# Patient Record
Sex: FEMALE | Race: WHITE | NOT HISPANIC OR LATINO | Employment: FULL TIME | URBAN - METROPOLITAN AREA
[De-identification: names, ages, dates, MRNs, and addresses within clinical notes are randomized per-mention and may not be internally consistent; named-entity substitution may affect disease eponyms.]

---

## 2017-02-21 ENCOUNTER — TELEPHONE (OUTPATIENT)
Dept: FAMILY MEDICINE | Facility: CLINIC | Age: 22
End: 2017-02-21

## 2017-02-21 NOTE — TELEPHONE ENCOUNTER
Ramila has been exhausted x 1 month.  Started to have congestion, draining the past couple of days.  Has cough and is loosing voice.  No fever.  Doesn't know if she should be seen or not. Please call and assess.  Last seen was 9/6/16.  Please call and assess. Thank you..Buffy Pyle

## 2017-02-22 ENCOUNTER — OFFICE VISIT (OUTPATIENT)
Dept: FAMILY MEDICINE | Facility: CLINIC | Age: 22
End: 2017-02-22
Payer: COMMERCIAL

## 2017-02-22 VITALS
DIASTOLIC BLOOD PRESSURE: 80 MMHG | HEIGHT: 69 IN | HEART RATE: 100 BPM | WEIGHT: 245 LBS | SYSTOLIC BLOOD PRESSURE: 128 MMHG | TEMPERATURE: 98.2 F | BODY MASS INDEX: 36.29 KG/M2

## 2017-02-22 DIAGNOSIS — J01.90 ACUTE SINUSITIS WITH SYMPTOMS > 10 DAYS: Primary | ICD-10-CM

## 2017-02-22 PROCEDURE — 99213 OFFICE O/P EST LOW 20 MIN: CPT | Performed by: FAMILY MEDICINE

## 2017-02-22 NOTE — NURSING NOTE
"Chief Complaint   Patient presents with     Cold Symptoms       Initial /80 (BP Location: Right arm, Cuff Size: Adult Large)  Pulse 100  Temp 98.2  F (36.8  C) (Tympanic)  Ht 5' 9\" (1.753 m)  Wt 245 lb (111.1 kg)  BMI 36.18 kg/m2 Estimated body mass index is 36.18 kg/(m^2) as calculated from the following:    Height as of this encounter: 5' 9\" (1.753 m).    Weight as of this encounter: 245 lb (111.1 kg).  Medication Reconciliation: complete     Radha Yadav CMA    "

## 2017-02-22 NOTE — PROGRESS NOTES
"SUBJECTIVE:  Ramila Kaye is a 21 year old female who presents with the following concerns;              Symptoms: cc Present Absent Comment   Fever/Chills  x  Hot flashes today   Fatigue x      Headache x      Muscle or Body  Aches  x     Eye Irritation   x    Sneezing   x    Nasal Freddy/Drg x   Started 6 days    Sinus Pressure/Pain   x    Dental pain   x    Sore Throat  x  Mild in the am   Swollen Glands   x    Ear Pain/Fullness   x    Cough  x  Intermittently    Wheeze   x    Chest Discomfort  x     Shortness of breath  x     Abdominal pain   x    Emesis    x    Diarrhea   x    Other   x      Symptom duration:  1 month fatigue & headaches. 6 days nasal congection   Symptom severity:     Treatments tried:  Ibuprofen at 11:30am   Contacts:  no   She has ? Fever feels warm   Exhausted for the last month did not have a cold was not sick that she remembers feels a bit off balance foggy   She has not changed schedules. She works 8-6 5 days a week and then 8-12 on sat   So she just thought she was run down has been doing this for a few months   She does no snore   Not tired in the morning gets about 8 hours of sleep . Has been getting worse in the last week or so headache has been on and off for the last month .   Starts later in the day there nagging but does not interfere with her activity   Has cough at night laying jey and in the morning took ibuprofen this am   Has taken some day quil sat sun mon didn't help   PMH  Patient Active Problem List   Diagnosis     CARDIOVASCULAR SCREENING; LDL GOAL LESS THAN 160     ROS: Constitutional, HEENT, cardiovascular, respiratory, GI, , and skin are otherwise negative except as noted above.    PHYSICAL EXAM:    /80 (BP Location: Right arm, Cuff Size: Adult Large)  Pulse 100  Temp 98.2  F (36.8  C) (Tympanic)  Ht 5' 9\" (1.753 m)  Wt 245 lb (111.1 kg)  BMI 36.18 kg/m2  GENERAL: Active, alert and no distress.  EYES: PERRL/EOMI.  Bilateral sclera/conjunctiva clear.  HEENT: " Audible congestion with no nasal discharge.no maxillary or frontal tenderness   TMs gray and translucent.  Oral mucosa moist and pink.  Posterior pharynx with increased erythema. Uvula midline.  NECK: Supple with full range of motion.  Bilateral shotty anterior cervical nodes.  CV: Regular rate and rhythm without murmur.  LUNGS: Clear to auscultation.  ABD: Soft, nontender, nondistended. No HSM or masses palpated.  SKIN:  No rash. Warm, pink. Capillary refill less than 2 seconds.    1. Acute sinusitis with symptoms > 10 dayswill treat please see patient in for further  in     - amoxicillin-clavulanate (AUGMENTIN) 875-125 MG per tablet; Take 1 tablet by mouth 2 times daily for 10 days  Dispense: 20 tablet; Refill: 0    Vanessa Ceballos M.D.  [facility]

## 2017-02-22 NOTE — PATIENT INSTRUCTIONS
Sinus pressure is primarily a problem of drainage.  You can best help your body clear the sinus secretions and pressure by opening up the natural passageways which are often blocked by viral colds and allergies.      Short courses of a nasal decongestant spray (Afrin or Neosinephrine) are one of the most effective tools in opening sinus drainage passageways.  Their use should be restricted to 3 days though due to the high risk of nasal addiction.  Pseudoephedrine or phenylephrine (Sudafed) is often helpful but it can cause elevations in blood pressure and insomnia.     Sometimes a nasal saline mist will help rinse out the nasal passages and feel good.     Guaifenesin (Robitussin or Mucinex) helps loosen secretions and often help make the mucous more liquid and easier to clear.    For pain and fevers, acetaminophen (Tylenol) is most appropriate.  Ibuprofen (Advil) or naproxen (Aleve) are useful too and last longer but they can cause elevation of blood pressure or stomach problems.    Antihistamines (Benadryl, Dimetapp, etc.) cause sedation, confusion, bowel and urinary abnormalities and are of little use for infectious causes of cough and nasal congestion.  Their use should be reserved for allergic symptoms.    The body needs to be treated well in order to help heal itself.  Rest as needed.  It is ok to reduce food intake if appetite is poor but it is quite important to maintain/increase fluid intake.  Sinus pressure and infections usually go away on their own with appropriate care.

## 2017-02-22 NOTE — MR AVS SNAPSHOT
After Visit Summary   2/22/2017    Ramila Kaye    MRN: [id number]           Patient Information     Date Of Birth          1995        Visit Information        Provider Department      2/22/2017 2:00 PM Vanessa Ceballos MD [facility]        Today's Diagnoses     Acute sinusitis with symptoms > 10 days    -  1      Care Instructions    Sinus pressure is primarily a problem of drainage.  You can best help your body clear the sinus secretions and pressure by opening up the natural passageways which are often blocked by viral colds and allergies.      Short courses of a nasal decongestant spray (Afrin or Neosinephrine) are one of the most effective tools in opening sinus drainage passageways.  Their use should be restricted to 3 days though due to the high risk of nasal addiction.  Pseudoephedrine or phenylephrine (Sudafed) is often helpful but it can cause elevations in blood pressure and insomnia.     Sometimes a nasal saline mist will help rinse out the nasal passages and feel good.     Guaifenesin (Robitussin or Mucinex) helps loosen secretions and often help make the mucous more liquid and easier to clear.    For pain and fevers, acetaminophen (Tylenol) is most appropriate.  Ibuprofen (Advil) or naproxen (Aleve) are useful too and last longer but they can cause elevation of blood pressure or stomach problems.    Antihistamines (Benadryl, Dimetapp, etc.) cause sedation, confusion, bowel and urinary abnormalities and are of little use for infectious causes of cough and nasal congestion.  Their use should be reserved for allergic symptoms.    The body needs to be treated well in order to help heal itself.  Rest as needed.  It is ok to reduce food intake if appetite is poor but it is quite important to maintain/increase fluid intake.  Sinus pressure and infections usually go away on their own with appropriate care.           Follow-ups after your visit        Who to contact     Normal  "or non-critical lab and imaging results will be communicated to you by [employer], letter or phone within 4 business days after the clinic has received the results. If you do not hear from us within 7 days, please contact the clinic through [employer] or phone. If you have a critical or abnormal lab result, we will notify you by phone as soon as possible.  Submit refill requests through [employer] or call your pharmacy and they will forward the refill request to us. Please allow 3 business days for your refill to be completed.          If you need to speak with a  for additional information , please call: [phone]             Additional Information About Your Visit        [employer] Information     [employer] gives you secure access to your electronic health record. If you see a primary care provider, you can also send messages to your care team and make appointments. If you have questions, please call your primary care clinic.  If you do not have a primary care provider, please call [phone] and they will assist you.        Care EveryWhere ID     This is your Care EveryWhere ID. This could be used by other organizations to access your [facility] medical records  [id number]        Your Vitals Were     Pulse Temperature Height BMI (Body Mass Index)          100 98.2  F (36.8  C) (Tympanic) 5' 9\" (1.753 m) 36.18 kg/m2         Blood Pressure from Last 3 Encounters:   02/22/17 128/80   09/06/16 132/79    Weight from Last 3 Encounters:   02/22/17 245 lb (111.1 kg)   09/06/16 247 lb 6.4 oz (112.2 kg)              Today, you had the following     No orders found for display         Today's Medication Changes          These changes are accurate as of: 2/22/17  2:12 PM.  If you have any questions, ask your nurse or doctor.               Start taking these medicines.        Dose/Directions    amoxicillin-clavulanate 875-125 MG per tablet   Commonly known as:  AUGMENTIN   Used for:  Acute sinusitis with symptoms > 10 " days   Started by:  Vanessa Ceballos MD        Dose:  1 tablet   Take 1 tablet by mouth 2 times daily for 10 days   Quantity:  20 tablet   Refills:  0            Where to get your medicines      These medications were sent to [place] [address] [place] [facility] - [address] [address]     Phone:  [phone]     amoxicillin-clavulanate 875-125 MG per tablet                Primary Care Provider    None Specified       No primary provider on file.        Thank you!     Thank you for choosing [facility]  for your care. Our goal is always to provide you with excellent care. Hearing back from our patients is one way we can continue to improve our services. Please take a few minutes to complete the written survey that you may receive in the mail after your visit with us. Thank you!             Your Updated Medication List - Protect others around you: Learn how to safely use, store and throw away your medicines at www.disposemymeds.org.          This list is accurate as of: 2/22/17  2:12 PM.  Always use your most recent med list.                   Brand Name Dispense Instructions for use    amoxicillin-clavulanate 875-125 MG per tablet    AUGMENTIN    20 tablet    Take 1 tablet by mouth 2 times daily for 10 days

## 2017-02-22 NOTE — LETTER
[facility]  [address] [address]  [phone]        February 22, 2017    Regarding:  Ramila Amezcuadanielangeline  [address] [facility] [address]              To Whom It May Concern;  She was seen today and missed work due to illness . She may return to work when she is fever free and well enough to return.           Sincerely,        Vanessa Ceballos MD

## 2017-06-18 ENCOUNTER — HOSPITAL ENCOUNTER (EMERGENCY)
Facility: CLINIC | Age: 22
Discharge: HOME | End: 2017-06-18
Attending: EMERGENCY MEDICINE | Admitting: EMERGENCY MEDICINE
Payer: COMMERCIAL

## 2017-06-18 VITALS
OXYGEN SATURATION: 96 % | DIASTOLIC BLOOD PRESSURE: 71 MMHG | SYSTOLIC BLOOD PRESSURE: 138 MMHG | TEMPERATURE: 98.7 F | HEART RATE: 117 BPM

## 2017-06-18 DIAGNOSIS — T67.5XXA HEAT EXHAUSTION, INITIAL ENCOUNTER: ICD-10-CM

## 2017-06-18 DIAGNOSIS — J02.9 ACUTE PHARYNGITIS: ICD-10-CM
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
## 2017-06-18 DIAGNOSIS — R07.0 THROAT PAIN: ICD-10-CM

## 2017-06-18 LAB
ALBUMIN SERPL-MCNC: 4.1 G/DL (ref 3.4–5)
ALBUMIN UR-MCNC: NEGATIVE MG/DL
ALP SERPL-CCNC: 82 U/L (ref 40–150)
ALT SERPL W P-5'-P-CCNC: 27 U/L (ref 0–50)
ANION GAP SERPL CALCULATED.3IONS-SCNC: 8 MMOL/L (ref 3–14)
APPEARANCE UR: CLEAR
AST SERPL W P-5'-P-CCNC: 18 U/L (ref 0–45)
BACTERIA #/AREA URNS HPF: ABNORMAL /HPF
BASOPHILS # BLD AUTO: 0 10E9/L (ref 0–0.2)
BASOPHILS NFR BLD AUTO: 0.2 %
BILIRUB SERPL-MCNC: 1.1 MG/DL (ref 0.2–1.3)
BILIRUB UR QL STRIP: NEGATIVE
BUN SERPL-MCNC: 9 MG/DL (ref 7–30)
CALCIUM SERPL-MCNC: 9.3 MG/DL (ref 8.5–10.1)
CHLORIDE SERPL-SCNC: 108 MMOL/L (ref 94–109)
CK SERPL-CCNC: 96 U/L (ref 30–225)
CO2 SERPL-SCNC: 24 MMOL/L (ref 20–32)
COLOR UR AUTO: YELLOW
CREAT SERPL-MCNC: 0.79 MG/DL (ref 0.52–1.04)
DEPRECATED S PYO AG THROAT QL EIA: NORMAL
DIFFERENTIAL METHOD BLD: ABNORMAL
EOSINOPHIL # BLD AUTO: 0.1 10E9/L (ref 0–0.7)
EOSINOPHIL NFR BLD AUTO: 0.7 %
ERYTHROCYTE [DISTWIDTH] IN BLOOD BY AUTOMATED COUNT: 13.6 % (ref 10–15)
GFR SERPL CREATININE-BSD FRML MDRD: NORMAL ML/MIN/1.7M2
GLUCOSE SERPL-MCNC: 95 MG/DL (ref 70–99)
GLUCOSE UR STRIP-MCNC: NEGATIVE MG/DL
HCG UR QL: NEGATIVE
HCT VFR BLD AUTO: 42.5 % (ref 35–47)
HGB BLD-MCNC: 13.9 G/DL (ref 11.7–15.7)
HGB UR QL STRIP: NEGATIVE
IMM GRANULOCYTES # BLD: 0 10E9/L (ref 0–0.4)
IMM GRANULOCYTES NFR BLD: 0.2 %
KETONES UR STRIP-MCNC: NEGATIVE MG/DL
LACTATE BLD-SCNC: 1 MMOL/L (ref 0.7–2.1)
LEUKOCYTE ESTERASE UR QL STRIP: ABNORMAL
LYMPHOCYTES # BLD AUTO: 1 10E9/L (ref 0.8–5.3)
LYMPHOCYTES NFR BLD AUTO: 8 %
MCH RBC QN AUTO: 26.9 PG (ref 26.5–33)
MCHC RBC AUTO-ENTMCNC: 32.7 G/DL (ref 31.5–36.5)
MCV RBC AUTO: 82 FL (ref 78–100)
MICRO REPORT STATUS: NORMAL
MONOCYTES # BLD AUTO: 0.9 10E9/L (ref 0–1.3)
MONOCYTES NFR BLD AUTO: 7.4 %
MUCOUS THREADS #/AREA URNS LPF: PRESENT /LPF
NEUTROPHILS # BLD AUTO: 10.1 10E9/L (ref 1.6–8.3)
NEUTROPHILS NFR BLD AUTO: 83.5 %
NITRATE UR QL: NEGATIVE
PH UR STRIP: 6 PH (ref 5–7)
PLATELET # BLD AUTO: 263 10E9/L (ref 150–450)
POTASSIUM SERPL-SCNC: 4.2 MMOL/L (ref 3.4–5.3)
PROT SERPL-MCNC: 7.8 G/DL (ref 6.8–8.8)
RBC # BLD AUTO: 5.16 10E12/L (ref 3.8–5.2)
RBC #/AREA URNS AUTO: <1 /HPF (ref 0–2)
SODIUM SERPL-SCNC: 140 MMOL/L (ref 133–144)
SP GR UR STRIP: 1.01 (ref 1–1.03)
SPECIMEN SOURCE: NORMAL
SQUAMOUS #/AREA URNS AUTO: 1 /HPF (ref 0–1)
URN SPEC COLLECT METH UR: ABNORMAL
UROBILINOGEN UR STRIP-MCNC: NORMAL MG/DL (ref 0–2)
WBC # BLD AUTO: 12.1 10E9/L (ref 4–11)
WBC #/AREA URNS AUTO: 6 /HPF (ref 0–2)

## 2017-06-18 PROCEDURE — 87880 STREP A ASSAY W/OPTIC: CPT | Performed by: EMERGENCY MEDICINE

## 2017-06-18 PROCEDURE — 81025 URINE PREGNANCY TEST: CPT | Performed by: EMERGENCY MEDICINE

## 2017-06-18 PROCEDURE — 96361 HYDRATE IV INFUSION ADD-ON: CPT | Mod: 59

## 2017-06-18 PROCEDURE — 99284 EMERGENCY DEPT VISIT MOD MDM: CPT | Mod: Z6 | Performed by: EMERGENCY MEDICINE

## 2017-06-18 PROCEDURE — 87081 CULTURE SCREEN ONLY: CPT | Mod: 59 | Performed by: EMERGENCY MEDICINE

## 2017-06-18 PROCEDURE — 96374 THER/PROPH/DIAG INJ IV PUSH: CPT

## 2017-06-18 PROCEDURE — 99284 EMERGENCY DEPT VISIT MOD MDM: CPT | Mod: 25

## 2017-06-18 PROCEDURE — 87086 URINE CULTURE/COLONY COUNT: CPT | Performed by: EMERGENCY MEDICINE

## 2017-06-18 PROCEDURE — 85025 COMPLETE CBC W/AUTO DIFF WBC: CPT | Performed by: EMERGENCY MEDICINE

## 2017-06-18 PROCEDURE — 82550 ASSAY OF CK (CPK): CPT | Performed by: EMERGENCY MEDICINE

## 2017-06-18 PROCEDURE — 80053 COMPREHEN METABOLIC PANEL: CPT | Performed by: EMERGENCY MEDICINE

## 2017-06-18 PROCEDURE — 83605 ASSAY OF LACTIC ACID: CPT | Performed by: EMERGENCY MEDICINE

## 2017-06-18 PROCEDURE — 81001 URINALYSIS AUTO W/SCOPE: CPT | Performed by: EMERGENCY MEDICINE

## 2017-06-18 PROCEDURE — 25000128 H RX IP 250 OP 636: Performed by: EMERGENCY MEDICINE

## 2017-06-18 RX ORDER — SODIUM CHLORIDE 9 MG/ML
1000 INJECTION, SOLUTION INTRAVENOUS CONTINUOUS
Status: DISCONTINUED | OUTPATIENT
Start: 2017-06-18 | End: 2017-06-19 | Stop reason: HOSPADM

## 2017-06-18 RX ORDER — ONDANSETRON 2 MG/ML
4 INJECTION INTRAMUSCULAR; INTRAVENOUS EVERY 30 MIN PRN
Status: DISCONTINUED | OUTPATIENT
Start: 2017-06-18 | End: 2017-06-19 | Stop reason: HOSPADM

## 2017-06-18 RX ADMIN — SODIUM CHLORIDE 1000 ML: 9 INJECTION, SOLUTION INTRAVENOUS at 21:03

## 2017-06-18 RX ADMIN — ONDANSETRON 4 MG: 2 INJECTION INTRAMUSCULAR; INTRAVENOUS at 21:12

## 2017-06-18 NOTE — ED AVS SNAPSHOT
[facility] Emergency Department    [address]    [facility] [address]    Phone:  [phone]    Fax:  [phone]                                       Ramila Kaye   MRN: [id number]    Department:  [facility] Emergency Department   Date of Visit:  6/18/2017           Patient Information     Date Of Birth          1995        Your diagnoses for this visit were:     Throat pain     Heat exhaustion, initial encounter        You were seen by Kt Thompson MD.      Follow-up Information     Follow up with Primary Roberto Reddy MD.    Why:  As needed        Discharge Instructions         Heat Exhaustion  Heat exhaustion is a condition that develops during prolonged exposure to heat. It is more likely to occur during strenuous activity, such as exercise or manual labor. Symptoms include a fast heartbeat, excess sweating, extreme tiredness, muscle cramps, headache, and weakness. They may also include stomach cramps, nausea, and vomiting. The person may be lightheaded and dizzy, and may even faint.  Treatment for heat exhaustion involves cooling the body down and replacing lost fluids, electrolytes, and salts. Cooling may be done with fans, cold cloths, or a cold-water bath. Fluids are best replaced by drinking electrolyte solution, a sports drink, or water with 2 teaspoons of salt added for each 8 ounces. If a person is very dehydrated, confused, or unable to drink, IV (intravenous) fluids will likely be needed.  Heat exhaustion can progress to a serious condition called heatstroke, so it should be treated right away.  Home care  Continue to drink extra cold fluids at home during the next 12-24 hours. Water, electrolyte solution, or sports drinks are advised. Avoid alcohol and caffeine.  Preventing heat illness:    Protect yourself from the heat. Wear lightweight, light-colored clothing and a broad-brimmed hat.    Drink plenty of fluids before and during activity.    Limit exercise in hot or very  humid weather. If you have to be active in the heat, take frequent breaks to drink fluids and cool down.    Avoid exercising when you are feeling ill.    Watch for symptoms of heat illness such as exhaustion, excess sweating, and lightheadedness. If any occur, move to a cool place, rest, and drink cool fluids. Lying down with your legs raised slightly can help you recover.    Avoid alcohol and caffeine.  Follow-up care  Follow up with your healthcare provider or this facility if symptoms do not improve within 1 hour.  When to seek medical advice  Call your healthcare provider right away for any of the following:    Inability to keep fluids down    Vomiting    Worsening symptoms or new symptoms  Call 911  Call 911 or emergency services right away for any of these symptoms of heatstroke:    Confusion    Irrational behavior    Hallucinations    Trouble walking    Seizures    Vomiting or diarrhea    Hot, flushed skin    Passing out    Fever of 104 F (40 C) or higher  Date Last Reviewed: 6/26/2015 2000-2017 [employer]. [address] [place] [address]. All rights reserved. This information is not intended as a substitute for professional medical care. Always follow your healthcare professional's instructions.          24 Hour Appointment Hotline       To make an appointment at any [facility], call [phone] ([phone]). If you don't have a family doctor or clinic, we will help you find one. [facility] are conveniently located to serve the needs of you and your family.             Review of your medicines      Notice     You have not been prescribed any medications.            Procedures and tests performed during your visit     Beta strep group A culture    CBC with platelets differential    CK total    Comprehensive metabolic panel    HCG qualitative urine    Lactic acid whole blood    Rapid Strep Screen    UA reflex to Microscopic      Orders Needing Specimen Collection      None      Pending Results     Date and Time Order Name Status Description    6/18/2017 2015 Beta strep group A culture In process             Pending Culture Results     Date and Time Order Name Status Description    6/18/2017 2015 Beta strep group A culture In process             Pending Results Instructions     If you had any lab results that were not finalized at the time of your Discharge, you can call the ED Lab Result RN at [phone]. You will be contacted by this team for any positive Lab results or changes in treatment. The nurses are available 7 days a week from 10A to 6:30P.  You can leave a message 24 hours per day and they will return your call.        Test Results From Your Hospital Stay        6/18/2017  8:47 PM      Component Results     Component    Specimen Description    Throat    Rapid Strep A Screen    NEGATIVE: No Group A streptococcal antigen detected by immunoassay, await   culture report.      Micro Report Status    FINAL 06/18/2017 6/18/2017  9:28 PM      Component Results     Component Value Ref Range & Units Status    WBC 12.1 (H) 4.0 - 11.0 10e9/L Final    RBC Count 5.16 3.8 - 5.2 10e12/L Final    Hemoglobin 13.9 11.7 - 15.7 g/dL Final    Hematocrit 42.5 35.0 - 47.0 % Final    MCV 82 78 - 100 fl Final    MCH 26.9 26.5 - 33.0 pg Final    MCHC 32.7 31.5 - 36.5 g/dL Final    RDW 13.6 10.0 - 15.0 % Final    Platelet Count 263 150 - 450 10e9/L Final    Diff Method Automated Method  Final    % Neutrophils 83.5 % Final    % Lymphocytes 8.0 % Final    % Monocytes 7.4 % Final    % Eosinophils 0.7 % Final    % Basophils 0.2 % Final    % Immature Granulocytes 0.2 % Final    Absolute Neutrophil 10.1 (H) 1.6 - 8.3 10e9/L Final    Absolute Lymphocytes 1.0 0.8 - 5.3 10e9/L Final    Absolute Monocytes 0.9 0.0 - 1.3 10e9/L Final    Absolute Eosinophils 0.1 0.0 - 0.7 10e9/L Final    Absolute Basophils 0.0 0.0 - 0.2 10e9/L Final    Abs Immature Granulocytes 0.0 0 - 0.4 10e9/L Final          6/18/2017  9:44 PM      Component Results     Component Value Ref Range & Units Status    Sodium 140 133 - 144 mmol/L Final    Potassium 4.2 3.4 - 5.3 mmol/L Final    Chloride 108 94 - 109 mmol/L Final    Carbon Dioxide 24 20 - 32 mmol/L Final    Anion Gap 8 3 - 14 mmol/L Final    Glucose 95 70 - 99 mg/dL Final    Urea Nitrogen 9 7 - 30 mg/dL Final    Creatinine 0.79 0.52 - 1.04 mg/dL Final    GFR Estimate >90  Non  GFR Calc   >60 mL/min/1.7m2 Final    GFR Estimate If Black >90   GFR Calc   >60 mL/min/1.7m2 Final    Calcium 9.3 8.5 - 10.1 mg/dL Final    Bilirubin Total 1.1 0.2 - 1.3 mg/dL Final    Albumin 4.1 3.4 - 5.0 g/dL Final    Protein Total 7.8 6.8 - 8.8 g/dL Final    Alkaline Phosphatase 82 40 - 150 U/L Final    ALT 27 0 - 50 U/L Final    AST 18 0 - 45 U/L Final         6/18/2017  9:27 PM      Component Results     Component Value Ref Range & Units Status    Lactic Acid 1.0 0.7 - 2.1 mmol/L Final         6/18/2017  9:37 PM      Component Results     Component Value Ref Range & Units Status    Color Urine Yellow  Final    Appearance Urine Clear  Final    Glucose Urine Negative NEG mg/dL Final    Bilirubin Urine Negative NEG Final    Ketones Urine Negative NEG mg/dL Final    Specific Gravity Urine 1.010 1.003 - 1.035 Final    Blood Urine Negative NEG Final    pH Urine 6.0 5.0 - 7.0 pH Final    Protein Albumin Urine Negative NEG mg/dL Final    Urobilinogen mg/dL Normal 0.0 - 2.0 mg/dL Final    Nitrite Urine Negative NEG Final    Leukocyte Esterase Urine Small (A) NEG Final    Source Midstream Urine  Final    RBC Urine <1 0 - 2 /HPF Final    WBC Urine 6 (H) 0 - 2 /HPF Final    Bacteria Urine Few (A) NEG /HPF Final    Squamous Epithelial /HPF Urine 1 0 - 1 /HPF Final    Mucous Urine Present (A) NEG /LPF Final         6/18/2017  9:27 PM      Component Results     Component Value Ref Range & Units Status    HCG Qual Urine Negative NEG Final         6/18/2017  9:44 PM      Component  Results     Component Value Ref Range & Units Status    CK Total 96 30 - 225 U/L Final         6/18/2017  9:24 PM                Thank you for choosing [facility]       Thank you for choosing [facility] for your care. Our goal is always to provide you with excellent care. Hearing back from our patients is one way we can continue to improve our services. Please take a few minutes to complete the written survey that you may receive in the mail after you visit with us. Thank you!        [employer] Information     [employer] gives you secure access to your electronic health record. If you see a primary care provider, you can also send messages to your care team and make appointments. If you have questions, please call your primary care clinic.  If you do not have a primary care provider, please call [phone] and they will assist you.        Care EveryWhere ID     This is your Care EveryWhere ID. This could be used by other organizations to access your [facility] medical records  [id number]        After Visit Summary       This is your record. Keep this with you and show to your community pharmacist(s) and doctor(s) at your next visit.

## 2017-06-18 NOTE — LETTER
[facility] EMERGENCY DEPARTMENT  [address] [address]  [phone]    2017    Ramila Kaye  [address] [place] [address]  [phone] (home)     : 1995      To Whom it may concern:    Ramila Kaye was seen in our Emergency Department today, 2017.  I expect her condition to improve over the next 1-2 days.  She may return to work when improved.    Sincerely,        Ktjoshua Thompson

## 2017-06-18 NOTE — ED AVS SNAPSHOT
[facility] Emergency Department    [address] [address]    Phone:  [phone]    Fax:  [phone]                                       Raimla Kaye   MRN: [id number]    Department:  [facility] Emergency Department   Date of Visit:  6/18/2017           After Visit Summary Signature Page     I have received my discharge instructions, and my questions have been answered. I have discussed any challenges I see with this plan with the nurse or doctor.    ..........................................................................................................................................  Patient/Patient Representative Signature      ..........................................................................................................................................  Patient Representative Print Name and Relationship to Patient    ..................................................               ................................................  Date                                            Time    ..........................................................................................................................................  Reviewed by Signature/Title    ...................................................              ..............................................  Date                                                            Time

## 2017-06-19 NOTE — ED PROVIDER NOTES
History     Chief Complaint   Patient presents with     Headache     ha on/off since wednesday, sore throat yesterday and today, low grade fever     HPI  Ramila Kaye is a 22 year old female who presents with concerns for heat exhaustion.  Patient states since Wednesday she's had intermittent headaches, sore throat, fever, and fatigue.  She works outside in the hot sun.  She denies history of heat exhaustion.  On Thursday she came home because she did not feel well and told her mother she had stopped sweating.  She denies chest pain or shortness of breath.  She's not had a cough.  Currently denies abdominal pain.  She's had intermittent nausea but none currently.  She's had no diarrhea.  Her urine is not dark.  She's trying to maintain hydration.  She denies risk of pregnancy.  She has not taken anything for the headache today.  No exposure to infectious illness.    I have reviewed the Medications, Allergies, Past Medical and Surgical History, and Social History in the Epic system.         Review of Systems all other systems reviewed and are negative.  Past Medical History:   Diagnosis Date     NO ACTIVE PROBLEMS      Patient Active Problem List   Diagnosis     CARDIOVASCULAR SCREENING; LDL GOAL LESS THAN 160     Current Facility-Administered Medications   Medication     0.9% sodium chloride infusion     ondansetron (ZOFRAN) injection 4 mg     No current outpatient prescriptions on file.      No Known Allergies  Social History     Social History     Marital status: Single     Spouse name: N/A     Number of children: N/A     Years of education: N/A     Occupational History     Not on file.     Social History Main Topics     Smoking status: Never Smoker     Smokeless tobacco: Not on file     Alcohol use Yes     Drug use: No     Sexual activity: No      Comment: never sexuality active 2/22/17     Other Topics Concern     Parent/Sibling W/ Cabg, Mi Or Angioplasty Before 65f 55m? No     Social History Narrative      No family history on file.      Physical Exam   BP: 134/80  Pulse: 117  Temp: 98.7  F (37.1  C)  SpO2: 98 %  Physical Exam Gen. alert cooperative female in mild to moderate distress.  HEENT shows her cheeks to be sunburned.  She is tender otherwise.  Ears are clear bilaterally.  Eyes show no injection or mattering.  The pupils are equal reactive to light and accommodation.  Extraocular motions are intact.  Nasal passage are patent.  Orally moist mucous and speech is clear and concise.  She has tonsillar hypertrophy with exudate.  Neck is supple without meningismus.  She has tender anterior nodes.  No stridor.  Lungs are clear without adventitious sounds.  Cardiac regular tachycardic rate without murmur.  Abdomen reveals active bowel sounds on palpation no localizing tenderness, masses, organomegaly.  Back there is no CVA tenderness.  Extremities reveal no edema, calf or thigh tenderness.  Other than sunburn on her cheeks and tanned skin, no skin rashes are noted.    ED Course     ED Course     Procedures             Critical Care time:  none               Labs Ordered and Resulted from Time of ED Arrival Up to the Time of Departure from the ED   CBC WITH PLATELETS DIFFERENTIAL - Abnormal; Notable for the following:        Result Value    WBC 12.1 (*)     Absolute Neutrophil 10.1 (*)     All other components within normal limits   URINE MACROSCOPIC WITH REFLEX TO MICRO - Abnormal; Notable for the following:     Leukocyte Esterase Urine Small (*)     WBC Urine 6 (*)     Bacteria Urine Few (*)     Mucous Urine Present (*)     All other components within normal limits   COMPREHENSIVE METABOLIC PANEL   LACTIC ACID WHOLE BLOOD   HCG QUALITATIVE URINE   CK TOTAL   RAPID STREP SCREEN   BETA STREP GROUP A CULTURE     Rapid strep was obtained.  IV was established and fluid bolus was provided.  Blood work is ordered.  Urinalysis is ordered.  9 PM strep is negative.  Cultures pending.  At 10:15 PM recheck patient states she  does feel better.  Discussed results for available blood work and urinalysis.  She has mild heat exhaustion.  Recommend she avoid excessive heat.  Push fluids.  Assessments & Plan (with Medical Decision Making)   Patient is a 22-year-old female presents with complaints of fatigue, headache, fever, sore throat, and concerns for heat exhaustion.  She states she works outside in the excessive heat and for the last 2 days had increasing symptoms described above.  No fever here.  Tachycardic but otherwise vitals are stable.  She had tonsillar hypertrophy without exudate.  Rapid strep was negative.  Cultures pending.  No meningismus.  Normal cardiac and respiratory to auscultation except tachycardia.  Benign abdominal exam.  Neurologically nonfocal.  Patient's blood work was unremarkable including her renal function and CPK.  She improved with IV fluids.  Handout on HEENT examination is provided.  She is given a work excuse.  Recommend pushing fluids.  Avoid excessive heat.  If strep culture is positive we will contact her.  Her urine was weakly positive and a culture is pending.  Treatment until cultures available.  I have reviewed the nursing notes.    I have reviewed the findings, diagnosis, plan and need for follow up with the patient.       New Prescriptions    No medications on file       Final diagnoses:   Throat pain   Heat exhaustion, initial encounter       6/18/2017   [facility] EMERGENCY DEPARTMENT     Kt Thompson MD  06/18/17 2258

## 2017-06-19 NOTE — DISCHARGE INSTRUCTIONS
Heat Exhaustion  Heat exhaustion is a condition that develops during prolonged exposure to heat. It is more likely to occur during strenuous activity, such as exercise or manual labor. Symptoms include a fast heartbeat, excess sweating, extreme tiredness, muscle cramps, headache, and weakness. They may also include stomach cramps, nausea, and vomiting. The person may be lightheaded and dizzy, and may even faint.  Treatment for heat exhaustion involves cooling the body down and replacing lost fluids, electrolytes, and salts. Cooling may be done with fans, cold cloths, or a cold-water bath. Fluids are best replaced by drinking electrolyte solution, a sports drink, or water with 2 teaspoons of salt added for each 8 ounces. If a person is very dehydrated, confused, or unable to drink, IV (intravenous) fluids will likely be needed.  Heat exhaustion can progress to a serious condition called heatstroke, so it should be treated right away.  Home care  Continue to drink extra cold fluids at home during the next 12-24 hours. Water, electrolyte solution, or sports drinks are advised. Avoid alcohol and caffeine.  Preventing heat illness:    Protect yourself from the heat. Wear lightweight, light-colored clothing and a broad-brimmed hat.    Drink plenty of fluids before and during activity.    Limit exercise in hot or very humid weather. If you have to be active in the heat, take frequent breaks to drink fluids and cool down.    Avoid exercising when you are feeling ill.    Watch for symptoms of heat illness such as exhaustion, excess sweating, and lightheadedness. If any occur, move to a cool place, rest, and drink cool fluids. Lying down with your legs raised slightly can help you recover.    Avoid alcohol and caffeine.  Follow-up care  Follow up with your healthcare provider or this facility if symptoms do not improve within 1 hour.  When to seek medical advice  Call your healthcare provider right away for any of the  following:    Inability to keep fluids down    Vomiting    Worsening symptoms or new symptoms  Call 911  Call 911 or emergency services right away for any of these symptoms of heatstroke:    Confusion    Irrational behavior    Hallucinations    Trouble walking    Seizures    Vomiting or diarrhea    Hot, flushed skin    Passing out    Fever of 104 F (40 C) or higher  Date Last Reviewed: 6/26/2015 2000-2017 [employer]. [address] [place] [address], [place] [address]. All rights reserved. This information is not intended as a substitute for professional medical care. Always follow your healthcare professional's instructions.

## 2017-06-19 NOTE — ED NOTES
Ha, ST, fever, and fatigue since Thursday, has had heat exposure this wed, thurs and fri. Frontal HA, no hx of migraines. No trauma or injury took ibu @ 1300 200mg.

## 2017-06-20 LAB
BACTERIA SPEC CULT: NORMAL
BACTERIA SPEC CULT: NORMAL
MICRO REPORT STATUS: NORMAL
MICRO REPORT STATUS: NORMAL
SPECIMEN SOURCE: NORMAL
SPECIMEN SOURCE: NORMAL

## 2017-09-13 ENCOUNTER — OFFICE VISIT (OUTPATIENT)
Dept: FAMILY MEDICINE | Facility: CLINIC | Age: 22
End: 2017-09-13
Payer: COMMERCIAL

## 2017-09-13 VITALS
WEIGHT: 246 LBS | SYSTOLIC BLOOD PRESSURE: 122 MMHG | TEMPERATURE: 99.3 F | BODY MASS INDEX: 36.43 KG/M2 | HEIGHT: 69 IN | HEART RATE: 83 BPM | RESPIRATION RATE: 16 BRPM | DIASTOLIC BLOOD PRESSURE: 79 MMHG

## 2017-09-13 DIAGNOSIS — R07.0 THROAT PAIN: ICD-10-CM

## 2017-09-13 DIAGNOSIS — J06.9 VIRAL URI: Primary | ICD-10-CM

## 2017-09-13 LAB
DEPRECATED S PYO AG THROAT QL EIA: NORMAL
SPECIMEN SOURCE: NORMAL

## 2017-09-13 PROCEDURE — 87081 CULTURE SCREEN ONLY: CPT | Performed by: INTERNAL MEDICINE

## 2017-09-13 PROCEDURE — 99213 OFFICE O/P EST LOW 20 MIN: CPT | Performed by: INTERNAL MEDICINE

## 2017-09-13 PROCEDURE — 87880 STREP A ASSAY W/OPTIC: CPT | Performed by: INTERNAL MEDICINE

## 2017-09-13 NOTE — PATIENT INSTRUCTIONS
1. You are due for a pap test.  Please schedule appointment as you leave today      You have a cold, which is a virus.  Antibiotics treat bacterial infections and not viral infections.  They will not cure or shorten a cold.  The main way to feel better is rest, hydration, good nutrition.  You can try some of the following over the counter medicines:    --For cough - try Robitussin DM or Mucinex DM (Acitve ingredients Guaifenesin and dextromethorphan)  --For nasal congestion, try saline nasal spray (Ocean brand or similar.  NOT Afrin)  --For sore throat and cough, try Chloraseptic spray, cough drops, warm salt water gargles or tea with honey.    --Use a humidifier at night  --For body aches and pains and fever, try acetaminophen or ibuprofen

## 2017-09-13 NOTE — MR AVS SNAPSHOT
After Visit Summary   9/13/2017    Ramila Kaye    MRN: [id number]           Patient Information     Date Of Birth          1995        Visit Information        Provider Department      9/13/2017 10:00 AM Gail Alexandra, DO [facility]        Today's Diagnoses     Throat pain    -  1      Care Instructions    1. You are due for a pap test.  Please schedule appointment as you leave today      You have a cold, which is a virus.  Antibiotics treat bacterial infections and not viral infections.  They will not cure or shorten a cold.  The main way to feel better is rest, hydration, good nutrition.  You can try some of the following over the counter medicines:    --For cough - try Robitussin DM or Mucinex DM (Acitve ingredients Guaifenesin and dextromethorphan)  --For nasal congestion, try saline nasal spray (Ocean brand or similar.  NOT Afrin)  --For sore throat and cough, try Chloraseptic spray, cough drops, warm salt water gargles or tea with honey.    --Use a humidifier at night  --For body aches and pains and fever, try acetaminophen or ibuprofen                Follow-ups after your visit        Who to contact     If you have questions or need follow up information about today's clinic visit or your schedule please contact [facility] directly at [phone].  Normal or non-critical lab and imaging results will be communicated to you by MyChart, letter or phone within 4 business days after the clinic has received the results. If you do not hear from us within 7 days, please contact the clinic through MyChart or phone. If you have a critical or abnormal lab result, we will notify you by phone as soon as possible.  Submit refill requests through [employer] or call your pharmacy and they will forward the refill request to us. Please allow 3 business days for your refill to be completed.          Additional Information About Your Visit        MyChart Information      "[employer] gives you secure access to your electronic health record. If you see a primary care provider, you can also send messages to your care team and make appointments. If you have questions, please call your primary care clinic.  If you do not have a primary care provider, please call [phone] and they will assist you.        Care EveryWhere ID     This is your Care EveryWhere ID. This could be used by other organizations to access your [facility] medical records  [id number]        Your Vitals Were     Pulse Temperature Respirations Height Last Period BMI (Body Mass Index)    83 99.3  F (37.4  C) (Tympanic) 16 5' 9\" (1.753 m) 09/10/2017 36.33 kg/m2       Blood Pressure from Last 3 Encounters:   09/13/17 122/79   06/18/17 138/71   02/22/17 128/80    Weight from Last 3 Encounters:   09/13/17 246 lb (111.6 kg)   02/22/17 245 lb (111.1 kg)   09/06/16 247 lb 6.4 oz (112.2 kg)              We Performed the Following     Beta strep group A culture     Rapid strep screen        Primary Care Provider    Unknown Primary MD Barry       No address on file        Equal Access to Services     [facility]: Hadii aad ku hadwendyo Solily, waaxda luqadaha, qaybta kaalmada adeegyada, bri downey . So [facility] [phone].    ATENCIÓN: Si habla español, tiene a regalado disposición servicios gratuitos de asistencia lingüística. Llame al [phone].    We comply with applicable federal civil rights laws and [place] laws. We do not discriminate on the basis of race, color, national origin, age, disability sex, sexual orientation or gender identity.            Thank you!     Thank you for choosing [facility]  for your care. Our goal is always to provide you with excellent care. Hearing back from our patients is one way we can continue to improve our services. Please take a few minutes to complete the written survey that you may receive in the mail after your visit with us. Thank you!           "   Your Updated Medication List - Protect others around you: Learn how to safely use, store and throw away your medicines at www.disposemymeds.org.      Notice  As of 9/13/2017 10:41 AM    You have not been prescribed any medications.

## 2017-09-13 NOTE — LETTER
September 13, 2017      Ramila Kaye  [address] [place] [id number]        To Whom It May Concern:    Ramila Kaye was seen in our clinic. She missed work due to appointment today        Sincerely,        Gail Alexandra, DO

## 2017-09-13 NOTE — NURSING NOTE
"Chief Complaint   Patient presents with     URI     Symptoms for three days - congestion, dry throat (feels tight); headache.     Patient Request for Note/Letter     Patient requesting note for work today.       Initial /79  Pulse 83  Temp 99.3  F (37.4  C) (Tympanic)  Resp 16  Ht 5' 9\" (1.753 m)  Wt 246 lb (111.6 kg)  LMP 09/10/2017  BMI 36.33 kg/m2 Estimated body mass index is 36.33 kg/(m^2) as calculated from the following:    Height as of this encounter: 5' 9\" (1.753 m).    Weight as of this encounter: 246 lb (111.6 kg).    Medication Reconciliation:  complete    eNena Quintero CMA (AAMA)  "

## 2017-09-13 NOTE — PROGRESS NOTES
"  SUBJECTIVE:   Ramila Kaye is a 22 year old female who presents to clinic today for the following health issues:      ENT Symptoms             Symptoms: cc Present Absent Comment   Fever/Chills  X  Possible low grade   Fatigue  X     Muscle Aches   X    Eye Irritation  X     Sneezing  X     Nasal Freddy/Drg  X  Congestion, mild   Sinus Pressure/Pain  X  HEADACHE   Loss of smell   X    Dental pain   X    Sore Throat   X    Swollen Glands  X  LEFT SIDE   Ear Pain/Fullness   X ITICHY   Cough  X  Mild    Wheeze   X    Chest Pain   X    Shortness of breath  X  DIFFICULTY TAKING DEEP BREATHS   Rash   X    Other  X  EARS SEEM TO ITCH; LEFT SIDE OF THROAT      Symptom duration:  3-5 DAYS   Symptom severity:  MODERATE   Treatments tried:  ADVIL, DAYQUIL   Contacts:  NONE     Throat is not painful, but feels dry and tight, hard to swallow.  Tonsils look abnormal.  Reports fatigue.  Overall symptoms are worsening.      No current outpatient prescriptions on file.       Reviewed and updated as needed this visit by clinical staffTobacco  Allergies  Meds  Problems  Med Hx  Surg Hx  Fam Hx  Soc Hx        Reviewed and updated as needed this visit by Provider  Allergies  Meds  Problems         ROS:  Constitutional, HEENT, cardiovascular, pulmonary, gi and gu systems are negative, except as otherwise noted.      OBJECTIVE:   /79  Pulse 83  Temp 99.3  F (37.4  C) (Tympanic)  Resp 16  Ht 5' 9\" (1.753 m)  Wt 246 lb (111.6 kg)  LMP 09/10/2017  BMI 36.33 kg/m2  Body mass index is 36.33 kg/(m^2).  GENERAL APPEARANCE: healthy, alert, no distress, over weight and skin warm to touch  EYES: Eyes grossly normal to inspection, PERRL and conjunctivae and sclerae normal  HENT: mildly cryptic left tonsil w/out exudate.  No significant posterior pharyngeal erythema  NECK: no adenopathy, no asymmetry, masses, or scars and thyroid normal to palpation  RESP: lungs clear to auscultation - no rales, rhonchi or wheezes  CV: " regular rates and rhythm, normal S1 S2, no S3 or S4 and no murmur, click or rub    Diagnostic Test Results:  Results for orders placed or performed in visit on 09/13/17 (from the past 24 hour(s))   Rapid strep screen   Result Value Ref Range    Specimen Description Throat     Rapid Strep A Screen       NEGATIVE: No Group A streptococcal antigen detected by immunoassay, await culture report.       ASSESSMENT/PLAN:       ICD-10-CM    1. Viral URI J06.9     B97.89    2. Throat pain R07.0 Rapid strep screen     Beta strep group A culture       1. You are due for a pap test.  Please schedule appointment as you leave today      You have a cold, which is a virus.  Antibiotics treat bacterial infections and not viral infections.  They will not cure or shorten a cold.  The main way to feel better is rest, hydration, good nutrition.  You can try some of the following over the counter medicines:    --For cough - try Robitussin DM or Mucinex DM (Acitve ingredients Guaifenesin and dextromethorphan)  --For nasal congestion, try saline nasal spray (Ocean brand or similar.  NOT Afrin)  --For sore throat and cough, try Chloraseptic spray, cough drops, warm salt water gargles or tea with honey.    --Use a humidifier at night  --For body aches and pains and fever, try acetaminophen or ibuprofen            Gail Aleaxndra,   [facility]

## 2017-09-14 LAB
BACTERIA SPEC CULT: NORMAL
SPECIMEN SOURCE: NORMAL

## 2017-09-18 ENCOUNTER — OFFICE VISIT (OUTPATIENT)
Dept: FAMILY MEDICINE | Facility: CLINIC | Age: 22
End: 2017-09-18
Payer: COMMERCIAL

## 2017-09-18 VITALS
HEIGHT: 69 IN | DIASTOLIC BLOOD PRESSURE: 68 MMHG | BODY MASS INDEX: 36.43 KG/M2 | TEMPERATURE: 98.7 F | SYSTOLIC BLOOD PRESSURE: 114 MMHG | WEIGHT: 246 LBS | HEART RATE: 96 BPM

## 2017-09-18 DIAGNOSIS — S90.32XA CONTUSION OF LEFT FOOT, INITIAL ENCOUNTER: Primary | ICD-10-CM

## 2017-09-18 PROCEDURE — 99213 OFFICE O/P EST LOW 20 MIN: CPT | Performed by: FAMILY MEDICINE

## 2017-09-18 NOTE — MR AVS SNAPSHOT
After Visit Summary   9/18/2017    Ramila Kaye    MRN: [id number]           Patient Information     Date Of Birth          1995        Visit Information        Provider Department      9/18/2017 7:20 AM Zachary Roman MD [facility]        Today's Diagnoses     Contusion of left foot, initial encounter    -  1      Care Instructions    No suspicious sign of fracture.  May take Naproxen 220 mg orally with food every 12 hrs as needed for pain.  Warm compress instead of cold since cold you reported increases pain.    If with worsening pain or with difficulty walking more in 1 week, see provider again.  Foot Contusion  You have a contusion. This is also called a bruise. There is swelling and some bleeding under the skin, but no broken bones. This injury generally takes a few days to a few weeks to heal.  During that time, the bruise will typically change in color from reddish, to purple-blue, to greenish-yellow, then to yellow-brown.  Home care    Elevate the foot to reduce pain and swelling. As much as possible, sit or lie down with the foot raised about the level of your heart. This is especially important during the first 48 hours.    Ice the foot to help reduce pain and swelling. Wrap a cold source (ice pack or ice cubes in a plastic bag) in a thin towel. Apply to the bruised area for 20 minutes every 1 to 2 hours the first day. Continue this 3 to 4 times a day until the pain and swelling goes away.    Unless another medicine was prescribed, you can take acetaminophen, ibuprofen, or naproxen to control pain. (If you have chronic liver or kidney disease or ever had a stomach ulcer or gastrointestinal bleeding, talk with your healthcare provider before using these medicines.)  Follow up  Follow up with your healthcare provider or our staff as advised. Call if you are not improving within 1 to 2 weeks.  When to seek medical advice   Call your healthcare provider right away  if you have any of the following:    Increased pain or swelling    Foot or leg becomes cold, blue, numb or tingly    Signs of infection: Warmth, drainage, or increased redness or pain around the bruise    Inability to move the injured foot     Frequent bruising for unknown reasons  Date Last Reviewed: 2/1/2017 2000-2017 [employer]. [address] [place] [address]. All rights reserved. This information is not intended as a substitute for professional medical care. Always follow your healthcare professional's instructions.                Follow-ups after your visit        Who to contact     If you have questions or need follow up information about today's clinic visit or your schedule please contact [facility] directly at [phone].  Normal or non-critical lab and imaging results will be communicated to you by [employer], letter or phone within 4 business days after the clinic has received the results. If you do not hear from us within 7 days, please contact the clinic through [employer] or phone. If you have a critical or abnormal lab result, we will notify you by phone as soon as possible.  Submit refill requests through [employer] or call your pharmacy and they will forward the refill request to us. Please allow 3 business days for your refill to be completed.          Additional Information About Your Visit        [employer] Information     [employer] gives you secure access to your electronic health record. If you see a primary care provider, you can also send messages to your care team and make appointments. If you have questions, please call your primary care clinic.  If you do not have a primary care provider, please call [phone] and they will assist you.        Care EveryWhere ID     This is your Care EveryWhere ID. This could be used by other organizations to access your [facility] medical records  [id number]        Your Vitals Were     Pulse Temperature Height Last Period  "BMI (Body Mass Index)       96 98.7  F (37.1  C) (Tympanic) 5' 9\" (1.753 m) 09/10/2017 36.33 kg/m2        Blood Pressure from Last 3 Encounters:   09/18/17 114/68   09/13/17 122/79   06/18/17 138/71    Weight from Last 3 Encounters:   09/18/17 246 lb (111.6 kg)   09/13/17 246 lb (111.6 kg)   02/22/17 245 lb (111.1 kg)              Today, you had the following     No orders found for display       Primary Care Provider    Unknown Primary MD Barry       No address on file        Equal Access to Services     [facility]: Hadii amada Escalante, tsering renee, rosanne lucas, bri downey . So [facility] [phone].    ATENCIÓN: Si habla español, tiene a regalado disposición servicios gratuitos de asistencia lingüística. Llame al [phone].    We comply with applicable federal civil rights laws and [place] laws. We do not discriminate on the basis of race, color, national origin, age, disability sex, sexual orientation or gender identity.            Thank you!     Thank you for choosing [facility]  for your care. Our goal is always to provide you with excellent care. Hearing back from our patients is one way we can continue to improve our services. Please take a few minutes to complete the written survey that you may receive in the mail after your visit with us. Thank you!             Your Updated Medication List - Protect others around you: Learn how to safely use, store and throw away your medicines at www.disposemymeds.org.      Notice  As of 9/18/2017  7:44 AM    You have not been prescribed any medications.      "

## 2017-09-18 NOTE — PROGRESS NOTES
SUBJECTIVE:   Ramila Kaye is a 22 year old female who presents to clinic today for the following health issues:  Chief Complaint   Patient presents with     Musculoskeletal Problem     Pt injured left foot at work on 9/14/17.         Joint Pain    Onset: 9/14/17    Description:   Location: left foot - posterior ankle and heel.  Character: Dull ache and shooting pain, throbbing at times  Patient was wearing tennis shoes when spare tire out of a SUV fell on her foot dorsum on left.    Intensity: 5-9/10    Progression of Symptoms: intermittent, worse with being on it and walking around    Accompanying Signs & Symptoms:  Other symptoms: radiation of pain to up around ankles, numbness and weakness of foot when walking    History:   Previous similar pain: no       Precipitating factors:   Trauma or overuse: YES- spare tire fell out of suv onto it at work    Alleviating factors:  Improved by: rest/inactivity  Therapies Tried and outcome: ibuprofen and ice-did not help    Verified above history with patient.        Problem list and histories reviewed & adjusted, as indicated.  Additional history: as documented    Patient Active Problem List   Diagnosis     CARDIOVASCULAR SCREENING; LDL GOAL LESS THAN 160     Past Surgical History:   Procedure Laterality Date     NO HISTORY OF SURGERY         Social History   Substance Use Topics     Smoking status: Never Smoker     Smokeless tobacco: Never Used     Alcohol use Yes     Family History   Problem Relation Age of Onset     Coronary Artery Disease Maternal Grandmother      Glaucoma Paternal Grandmother          No current outpatient prescriptions on file.     No Known Allergies      Reviewed and updated as needed this visit by clinical staffTobacco  Allergies  Meds  Problems  Med Hx  Surg Hx  Fam Hx  Soc Hx        Reviewed and updated as needed this visit by Provider  Allergies  Meds  Problems         ROS:  C: NEGATIVE for fever, chills, change in weight  I:  "NEGATIVE for worrisome rashes, moles or lesions  MUSCULOSKELETAL:see above  N: NEGATIVE for weakness, dizziness or paresthesias  H: NEGATIVE for bleeding problems    OBJECTIVE:                                                    /68  Pulse 96  Temp 98.7  F (37.1  C) (Tympanic)  Ht 5' 9\" (1.753 m)  Wt 246 lb (111.6 kg)  LMP 09/10/2017  BMI 36.33 kg/m2  Body mass index is 36.33 kg/(m^2).  GENERAL:  alert and no distress, ambulatory with mild antalgia    Left Foot Exam:   Inspection:  Mild erythema dorsum, midfoot  Tender::posterior aspect of heel  Non-tender:rest of the foot  Range of Motion:full extension and flexion of toes with no pain; full range of motion of ankle with no pain      SKIN: no ecchymosis    Diagnostic test results:  Diagnostic Test Results:  No results found for this or any previous visit (from the past 24 hour(s)).       ASSESSMENT/PLAN:                                                        ICD-10-CM    1. Contusion of left foot, initial encounter S90.32XA      Discussed with patient no red flags to suspect fracture.  Discussed nature, course and treatment of foot contusions.  Supportive treatment:  NSAIDs, activity modification and topical analgesics OTC as needed.  RICE Tx  Return precautions given.    Follow up with Provider - prn   Patient Instructions   No suspicious sign of fracture.  May take Naproxen 220 mg orally with food every 12 hrs as needed for pain.  Warm compress instead of cold since cold you reported increases pain.    If with worsening pain or with difficulty walking more in 1 week, see provider again.  Foot Contusion  You have a contusion. This is also called a bruise. There is swelling and some bleeding under the skin, but no broken bones. This injury generally takes a few days to a few weeks to heal.  During that time, the bruise will typically change in color from reddish, to purple-blue, to greenish-yellow, then to yellow-brown.  Home care    Elevate the foot to " reduce pain and swelling. As much as possible, sit or lie down with the foot raised about the level of your heart. This is especially important during the first 48 hours.    Ice the foot to help reduce pain and swelling. Wrap a cold source (ice pack or ice cubes in a plastic bag) in a thin towel. Apply to the bruised area for 20 minutes every 1 to 2 hours the first day. Continue this 3 to 4 times a day until the pain and swelling goes away.    Unless another medicine was prescribed, you can take acetaminophen, ibuprofen, or naproxen to control pain. (If you have chronic liver or kidney disease or ever had a stomach ulcer or gastrointestinal bleeding, talk with your healthcare provider before using these medicines.)  Follow up  Follow up with your healthcare provider or our staff as advised. Call if you are not improving within 1 to 2 weeks.  When to seek medical advice   Call your healthcare provider right away if you have any of the following:    Increased pain or swelling    Foot or leg becomes cold, blue, numb or tingly    Signs of infection: Warmth, drainage, or increased redness or pain around the bruise    Inability to move the injured foot     Frequent bruising for unknown reasons  Date Last Reviewed: 2/1/2017 2000-2017 [employer]. [address] [place] [address], [place] [address]. All rights reserved. This information is not intended as a substitute for professional medical care. Always follow your healthcare professional's instructions.            Zachary Roman MD  [facility]

## 2017-09-18 NOTE — PATIENT INSTRUCTIONS
No suspicious sign of fracture.  May take Naproxen 220 mg orally with food every 12 hrs as needed for pain.  Warm compress instead of cold since cold you reported increases pain.    If with worsening pain or with difficulty walking more in 1 week, see provider again.  Foot Contusion  You have a contusion. This is also called a bruise. There is swelling and some bleeding under the skin, but no broken bones. This injury generally takes a few days to a few weeks to heal.  During that time, the bruise will typically change in color from reddish, to purple-blue, to greenish-yellow, then to yellow-brown.  Home care    Elevate the foot to reduce pain and swelling. As much as possible, sit or lie down with the foot raised about the level of your heart. This is especially important during the first 48 hours.    Ice the foot to help reduce pain and swelling. Wrap a cold source (ice pack or ice cubes in a plastic bag) in a thin towel. Apply to the bruised area for 20 minutes every 1 to 2 hours the first day. Continue this 3 to 4 times a day until the pain and swelling goes away.    Unless another medicine was prescribed, you can take acetaminophen, ibuprofen, or naproxen to control pain. (If you have chronic liver or kidney disease or ever had a stomach ulcer or gastrointestinal bleeding, talk with your healthcare provider before using these medicines.)  Follow up  Follow up with your healthcare provider or our staff as advised. Call if you are not improving within 1 to 2 weeks.  When to seek medical advice   Call your healthcare provider right away if you have any of the following:    Increased pain or swelling    Foot or leg becomes cold, blue, numb or tingly    Signs of infection: Warmth, drainage, or increased redness or pain around the bruise    Inability to move the injured foot     Frequent bruising for unknown reasons  Date Last Reviewed: 2/1/2017 2000-2017 [employer]. [address] [place] [address],  [place] [address]. All rights reserved. This information is not intended as a substitute for professional medical care. Always follow your healthcare professional's instructions.

## 2017-10-11 ENCOUNTER — OFFICE VISIT (OUTPATIENT)
Dept: FAMILY MEDICINE | Facility: CLINIC | Age: 22
End: 2017-10-11
Payer: COMMERCIAL

## 2017-10-11 VITALS
HEIGHT: 69 IN | SYSTOLIC BLOOD PRESSURE: 125 MMHG | HEART RATE: 83 BPM | DIASTOLIC BLOOD PRESSURE: 78 MMHG | TEMPERATURE: 98.4 F | WEIGHT: 242.4 LBS | BODY MASS INDEX: 35.9 KG/M2

## 2017-10-11 DIAGNOSIS — Z28.9 IMMUNIZATION NOT CARRIED OUT: ICD-10-CM

## 2017-10-11 DIAGNOSIS — R10.13 ABDOMINAL PAIN, EPIGASTRIC: Primary | ICD-10-CM

## 2017-10-11 DIAGNOSIS — R10.11 ABDOMINAL PAIN, RIGHT UPPER QUADRANT: ICD-10-CM

## 2017-10-11 LAB
BASOPHILS # BLD AUTO: 0 10E9/L (ref 0–0.2)
BASOPHILS NFR BLD AUTO: 0.3 %
DIFFERENTIAL METHOD BLD: NORMAL
EOSINOPHIL # BLD AUTO: 0.2 10E9/L (ref 0–0.7)
EOSINOPHIL NFR BLD AUTO: 2.3 %
ERYTHROCYTE [DISTWIDTH] IN BLOOD BY AUTOMATED COUNT: 13.3 % (ref 10–15)
HCT VFR BLD AUTO: 42.1 % (ref 35–47)
HGB BLD-MCNC: 13.9 G/DL (ref 11.7–15.7)
LYMPHOCYTES # BLD AUTO: 1.1 10E9/L (ref 0.8–5.3)
LYMPHOCYTES NFR BLD AUTO: 10.5 %
MCH RBC QN AUTO: 27.7 PG (ref 26.5–33)
MCHC RBC AUTO-ENTMCNC: 33 G/DL (ref 31.5–36.5)
MCV RBC AUTO: 84 FL (ref 78–100)
MONOCYTES # BLD AUTO: 0.7 10E9/L (ref 0–1.3)
MONOCYTES NFR BLD AUTO: 6.5 %
NEUTROPHILS # BLD AUTO: 8.2 10E9/L (ref 1.6–8.3)
NEUTROPHILS NFR BLD AUTO: 80.4 %
PLATELET # BLD AUTO: 250 10E9/L (ref 150–450)
RBC # BLD AUTO: 5.02 10E12/L (ref 3.8–5.2)
WBC # BLD AUTO: 10.2 10E9/L (ref 4–11)

## 2017-10-11 PROCEDURE — 80053 COMPREHEN METABOLIC PANEL: CPT | Performed by: PHYSICIAN ASSISTANT

## 2017-10-11 PROCEDURE — 99213 OFFICE O/P EST LOW 20 MIN: CPT | Performed by: PHYSICIAN ASSISTANT

## 2017-10-11 PROCEDURE — 83690 ASSAY OF LIPASE: CPT | Performed by: PHYSICIAN ASSISTANT

## 2017-10-11 PROCEDURE — 36415 COLL VENOUS BLD VENIPUNCTURE: CPT | Performed by: PHYSICIAN ASSISTANT

## 2017-10-11 PROCEDURE — 85025 COMPLETE CBC W/AUTO DIFF WBC: CPT | Performed by: PHYSICIAN ASSISTANT

## 2017-10-11 NOTE — NURSING NOTE
"Chief Complaint   Patient presents with     Abdominal Pain       Initial /78 (BP Location: Right arm, Patient Position: Sitting, Cuff Size: Adult Regular)  Pulse 83  Temp 98.4  F (36.9  C) (Tympanic)  Ht 5' 8.9\" (1.75 m)  Wt 242 lb 6.4 oz (110 kg)  LMP 09/30/2017  BMI 35.9 kg/m2 Estimated body mass index is 35.9 kg/(m^2) as calculated from the following:    Height as of this encounter: 5' 8.9\" (1.75 m).    Weight as of this encounter: 242 lb 6.4 oz (110 kg).  Medication Reconciliation: complete   Debbie Lucia CMA  "

## 2017-10-11 NOTE — PROGRESS NOTES
"SUBJECTIVE:                                                    Ramila Kaye is a 22 year old female who presents to clinic today for the following health issues:    ABDOMINAL   PAIN     Onset: 10/4/2017    Description:   Character: Cramping and Squeezing   Location: epigastric region  Radiation: None    Intensity: mild    Progression of Symptoms:  same    Accompanying Signs & Symptoms:  Fever/Chills?: States she was really warm and sweating today  Gas/Bloating: YES- bloating, has been burping alot  Nausea: YES  Vomitting: no  Diarrhea?: no   Constipation:no   Dysuria or Hematuria: no    History:   Trauma: no   Previous similar pain: no    Previous tests done: none    Precipitating factors:   Does the pain change with:     Food: States that food has been helping does notice some discomfort when she has no food    BM: no     Urination: no     Alleviating factors:  Did try tums which she said it did help    Therapies Tried and outcome: Has tried Tums     LMP:  9/30/2017     Eating/drinking okay. Loss of appetite sometimes.  Normal daily BMs  Heartburn: no  Feels better with \"food in my stomach\", worse when she is hungry.  Nausea with the pain. No vomiting.        Problem list and histories reviewed & adjusted, as indicated.  Additional history: none    Patient Active Problem List   Diagnosis     CARDIOVASCULAR SCREENING; LDL GOAL LESS THAN 160     Immunization not carried out - Declines HPV vaccine     Past Surgical History:   Procedure Laterality Date     NO HISTORY OF SURGERY         Social History   Substance Use Topics     Smoking status: Never Smoker     Smokeless tobacco: Never Used     Alcohol use Yes     Family History   Problem Relation Age of Onset     Coronary Artery Disease Maternal Grandmother      Glaucoma Paternal Grandmother      GERD Brother              ROS:Other than noted above, general, HEENT, respiratory, cardiac, MS, and gastrointestinal systems are negative.     OBJECTIVE:                      " "                              /78 (BP Location: Right arm, Patient Position: Sitting, Cuff Size: Adult Regular)  Pulse 83  Temp 98.4  F (36.9  C) (Tympanic)  Ht 5' 8.9\" (1.75 m)  Wt 242 lb 6.4 oz (110 kg)  LMP 09/30/2017  BMI 35.9 kg/m2 Body mass index is 35.9 kg/(m^2).   GENERAL: healthy, alert, well nourished, well hydrated, no distress  RESP: lungs clear to auscultation - no rales, no rhonchi, no wheezes  CV: regular rates and rhythm, normal S1 S2, no S3 or S4 and no murmur, no click or rub -  ABDOMEN: soft, POSITIVE mild RUQ/epigastric tenderness, no rebound or guarding, painful with Hernandez but did not intake breath. no  hepatosplenomegaly, no masses, normal bowel sounds  BACK: no CVA tenderness, no paralumbar tenderness       ASSESSMENT/PLAN:                                                      ASSESSMENT/PLAN:      ICD-10-CM    1. Abdominal pain, epigastric R10.13 US Abdomen Complete     Comprehensive metabolic panel     CBC with platelets differential     Lipase     ranitidine (ZANTAC) 150 MG tablet     omeprazole (PRILOSEC) 20 MG CR capsule   2. Abdominal pain, right upper quadrant R10.11 US Abdomen Complete     Comprehensive metabolic panel     CBC with platelets differential     Lipase   3. Immunization not carried out - Declines HPV vaccine Z28.9      She will come back for PAP smear, declined today.  Discussed various possible etiologies - including atypical gastritis/gallbladder, viral.    Patient Instructions   Treatment for gastritis/stomach irritation:  - zantac 150 mg two times daily for 1-2 weeks to block acid  - prilosec 20 mg on empty stomach for 1 month to prevent extra acid    If not improving:   Ultrasound of abdomen: For [place] imaging department: call [phone] to schedule     Be seen urgently if worsening or new symptoms     Gail Hyatt PA-C   [facility]    "

## 2017-10-11 NOTE — MR AVS SNAPSHOT
After Visit Summary   10/11/2017    Ramila Kaye    MRN: [id number]           Patient Information     Date Of Birth          1995        Visit Information        Provider Department      10/11/2017 11:20 AM Gail Hyatt PA-C [facility]        Today's Diagnoses     Abdominal pain, epigastric    -  1    Abdominal pain, right upper quadrant          Care Instructions    Treatment for gastritis/stomach irritation:  - zantac 150 mg two times daily for 1-2 weeks to block acid  - prilosec 20 mg on empty stomach for 1 month to prevent extra acid    If not improving:   Ultrasound of abdomen: For [place] imaging department: call [phone] to schedule     Be seen urgently if worsening or new symptoms                 Heartburn/GERD   What is heartburn?   Heartburn refers to the symptoms you feel when acids in your stomach flow back into the esophagus. (The esophagus is the tube that carries food from your throat to your stomach.) This backward movement of stomach acid is called reflux. The acid can burn and irritate the esophagus, throat, and vocal cords.   Heartburn is a common problem. Despite its name, it has nothing to do with the heart.   When you have heartburn often, you may have a condition called gastroesophageal reflux disease, or GERD.   How does it occur?   At the bottom of the esophagus there is a ring of muscle called a sphincter. It acts like a valve. When you swallow food, the sphincter opens to let the food pass into the stomach. The ring then closes to keep the stomach contents from going back into the esophagus. If the sphincter is weak or too relaxed, stomach acid and food flow backward into the esophagus. Because the esophagus does not have the protective lining that the stomach has, the acid causes pain.   The sphincter muscle sometimes does not work properly if:   You are overweight.   You are pregnant.   You have a hiatal hernia (a condition in which part of the  stomach protrudes through the diaphragm into the chest).   You eat too much.   You lie down soon after eating.   You wear tight clothes that push on your stomach.   Foods that may make heartburn worse are:   foods high in fat   sugar   chocolate   peppermint   onions   citrus foods such as orange juice   tomato-based foods   spicy foods   coffee and other drinks with caffeine, such as tea and yessica   alcohol.   Heartburn can also be made worse by:   taking certain medicines, such as aspirin   smoking cigarettes.   Anyone can have an attack of heartburn from overeating or eating foods that are high in acid. Most of the time heartburn is mild and lasts for a short time. There is usually not a problem when heartburn occurs just once in a while. You should see your healthcare provider if:   You have heartburn nearly every day for 2 weeks.   The heartburn comes back when the antacid wears off.   Heartburn wakes you up at night.   What are the symptoms?   The main symptom of heartburn is a burning pain in the lower chest, usually close to the bottom of the breastbone. Other symptoms you may have are:   acid or sour taste in your mouth   belching   a feeling of bloating or fullness in the stomach.   These symptoms tend to happen after very large meals and especially with activity such as bending or lifting after meals. The symptoms may be made worse by lying down or by wearing tight clothing.   Heartburn is very common during the last few months of pregnancy. The weight of the baby pushes on the stomach and can cause the sphincter to relax and let acid to flow back into the esophagus.   How is it diagnosed?   Usually heartburn can be diagnosed from your medical history.   If there is any question about the diagnosis, you may have the following tests to check for ulcers or other problems that might cause your symptoms:   barium swallow X-ray study of the esophagus   complete upper GI (gastrointestinal) barium X-ray study of  the esophagus, stomach, and upper intestine   endoscopy, a procedure in which a thin flexible tube with a tiny camera is placed in your mouth and down into your stomach so your provider can see your esophagus and stomach.   How is it treated?   To help reduce the symptoms of heartburn you can:   Try not to put a lot of pressure on the sphincter muscle. Eating light meals and wearing loose clothing will help.   Lose weight if you are overweight.   Take nonprescription antacids (tablets or liquid) after meals and at bedtime.   Raise the head of your bed or use more than one pillow so your head is higher than your stomach. This may allow gravity to help keep food from backing up.   If you find that certain foods or drinks seem to cause your symptoms or make them worse, avoid those foods.   If the simple measures described above do not relieve the symptoms, your healthcare provider may prescribe medicine. The prescription medicines help reduce stomach acid. They also help stomach emptying. A very few people who are not helped with medicines may need surgery.   Get emergency care if the following symptoms occur with the heartburn and do not go away within 15 minutes of treatment for heartburn: shortness of breath; sweating; light-headedness, weakness; or jaw, arm, back, or chest pain.   How long will the effects last?   Heartburn symptoms are usually relieved by treatment in just a few hours or less. If you are having heartburn every day, starting treatment will usually relieve the symptoms in a few days. However, the symptoms may come back from time to time, especially if you gain weight.   Heartburn can sometimes make asthma worse. If you have asthma, preventing or controlling heartburn may help control your asthma symptoms.   How can I help prevent heartburn?   The best prevention is to:   Keep a healthy weight. Lose weight if you are overweight.   Sleep with your head elevated at least 4 to 6 inches. (It's usually  most comfortable to put the head of your bed on blocks.)   It may also help if you:   Wait an hour or longer after eating before you lie down. If you have to lie down after a meal, lie on your left side. Keep your head and shoulders slightly higher than the rest of your body. It's best to not eat for 2 to 3 hours before you go to bed.   Eat smaller, more frequent meals.   Avoid wearing tight clothing or belts.   Don't smoke. Smoking relaxes the sphincter leading to your stomach.   Avoid foods and other things that seem to cause heartburn or make it worse.   Developed by [employer].   Published by [employer].   Last modified: 2009-01-14   Last reviewed: 2008-12-02             Follow-ups after your visit        Future tests that were ordered for you today     Open Future Orders        Priority Expected Expires Ordered    US Abdomen Complete Routine  10/11/2018 10/11/2017            Who to contact     Normal or non-critical lab and imaging results will be communicated to you by manetcht, letter or phone within 4 business days after the clinic has received the results. If you do not hear from us within 7 days, please contact the clinic through manetcht or phone. If you have a critical or abnormal lab result, we will notify you by phone as soon as possible.  Submit refill requests through [employer] or call your pharmacy and they will forward the refill request to us. Please allow 3 business days for your refill to be completed.          If you need to speak with a  for additional information , please call: [phone]             Additional Information About Your Visit        [employer] Information     [employer] gives you secure access to your electronic health record. If you see a primary care provider, you can also send messages to your care team and make appointments. If you have questions, please call your primary care clinic.  If you do not have a primary care provider, please call [phone] and they  "will assist you.        Care EveryWhere ID     This is your Care EveryWhere ID. This could be used by other organizations to access your [place] medical records  [id number]        Your Vitals Were     Pulse Temperature Height Last Period BMI (Body Mass Index)       83 98.4  F (36.9  C) (Tympanic) 5' 8.9\" (1.75 m) 09/30/2017 35.9 kg/m2        Blood Pressure from Last 3 Encounters:   10/11/17 125/78   09/18/17 114/68   09/13/17 122/79    Weight from Last 3 Encounters:   10/11/17 242 lb 6.4 oz (110 kg)   09/18/17 246 lb (111.6 kg)   09/13/17 246 lb (111.6 kg)              We Performed the Following     CBC with platelets differential     Comprehensive metabolic panel     Lipase          Today's Medication Changes          These changes are accurate as of: 10/11/17 11:58 AM.  If you have any questions, ask your nurse or doctor.               Start taking these medicines.        Dose/Directions    omeprazole 20 MG CR capsule   Commonly known as:  priLOSEC   Used for:  Abdominal pain, epigastric   Started by:  Gail Hyatt PA-C        Dose:  20 mg   Take 1 capsule (20 mg) by mouth daily   Quantity:  30 capsule   Refills:  1       ranitidine 150 MG tablet   Commonly known as:  ZANTAC   Used for:  Abdominal pain, epigastric   Started by:  Gail Hyatt PA-C        Dose:  150 mg   Take 1 tablet (150 mg) by mouth 2 times daily   Quantity:  60 tablet   Refills:  1            Where to get your medicines      These medications [place] [facility] [place] [address] [facility]  [address] [facility] [address]     Phone:  [phone]     omeprazole 20 MG CR capsule    ranitidine 150 MG tablet                Primary Care Provider    None Specified       No primary provider on file.        Equal Access to Services     IVANNA EUBANKS : John Escalante, tsering renee, qaybta kaallissy lucas, bri etienne. [facility] [phone].    ATENCIÓN: Si " manuel hall, tiene a regalado disposición servicios gratuitos de asistencia lingüística. Jordan martines [phone].    We comply with applicable federal civil rights laws and [place] laws. We do not discriminate on the basis of race, color, national origin, age, disability, sex, sexual orientation, or gender identity.            Thank you!     Thank you for choosing [facility]  for your care. Our goal is always to provide you with excellent care. Hearing back from our patients is one way we can continue to improve our services. Please take a few minutes to complete the written survey that you may receive in the mail after your visit with us. Thank you!             Your Updated Medication List - Protect others around you: Learn how to safely use, store and throw away your medicines at www.disposemymeds.org.          This list is accurate as of: 10/11/17 11:58 AM.  Always use your most recent med list.                   Brand Name Dispense Instructions for use Diagnosis    omeprazole 20 MG CR capsule    priLOSEC    30 capsule    Take 1 capsule (20 mg) by mouth daily    Abdominal pain, epigastric       ranitidine 150 MG tablet    ZANTAC    60 tablet    Take 1 tablet (150 mg) by mouth 2 times daily    Abdominal pain, epigastric

## 2017-10-11 NOTE — PATIENT INSTRUCTIONS
Treatment for gastritis/stomach irritation:  - zantac 150 mg two times daily for 1-2 weeks to block acid  - prilosec 20 mg on empty stomach for 1 month to prevent extra acid    If not improving:   Ultrasound of abdomen: For [place] imaging department: call [phone] to schedule     Be seen urgently if worsening or new symptoms                 Heartburn/GERD   What is heartburn?   Heartburn refers to the symptoms you feel when acids in your stomach flow back into the esophagus. (The esophagus is the tube that carries food from your throat to your stomach.) This backward movement of stomach acid is called reflux. The acid can burn and irritate the esophagus, throat, and vocal cords.   Heartburn is a common problem. Despite its name, it has nothing to do with the heart.   When you have heartburn often, you may have a condition called gastroesophageal reflux disease, or GERD.   How does it occur?   At the bottom of the esophagus there is a ring of muscle called a sphincter. It acts like a valve. When you swallow food, the sphincter opens to let the food pass into the stomach. The ring then closes to keep the stomach contents from going back into the esophagus. If the sphincter is weak or too relaxed, stomach acid and food flow backward into the esophagus. Because the esophagus does not have the protective lining that the stomach has, the acid causes pain.   The sphincter muscle sometimes does not work properly if:   You are overweight.   You are pregnant.   You have a hiatal hernia (a condition in which part of the stomach protrudes through the diaphragm into the chest).   You eat too much.   You lie down soon after eating.   You wear tight clothes that push on your stomach.   Foods that may make heartburn worse are:   foods high in fat   sugar   chocolate   peppermint   onions   citrus foods such as orange juice   tomato-based foods   spicy foods   coffee and other drinks with caffeine, such as tea and yessica    alcohol.   Heartburn can also be made worse by:   taking certain medicines, such as aspirin   smoking cigarettes.   Anyone can have an attack of heartburn from overeating or eating foods that are high in acid. Most of the time heartburn is mild and lasts for a short time. There is usually not a problem when heartburn occurs just once in a while. You should see your healthcare provider if:   You have heartburn nearly every day for 2 weeks.   The heartburn comes back when the antacid wears off.   Heartburn wakes you up at night.   What are the symptoms?   The main symptom of heartburn is a burning pain in the lower chest, usually close to the bottom of the breastbone. Other symptoms you may have are:   acid or sour taste in your mouth   belching   a feeling of bloating or fullness in the stomach.   These symptoms tend to happen after very large meals and especially with activity such as bending or lifting after meals. The symptoms may be made worse by lying down or by wearing tight clothing.   Heartburn is very common during the last few months of pregnancy. The weight of the baby pushes on the stomach and can cause the sphincter to relax and let acid to flow back into the esophagus.   How is it diagnosed?   Usually heartburn can be diagnosed from your medical history.   If there is any question about the diagnosis, you may have the following tests to check for ulcers or other problems that might cause your symptoms:   barium swallow X-ray study of the esophagus   complete upper GI (gastrointestinal) barium X-ray study of the esophagus, stomach, and upper intestine   endoscopy, a procedure in which a thin flexible tube with a tiny camera is placed in your mouth and down into your stomach so your provider can see your esophagus and stomach.   How is it treated?   To help reduce the symptoms of heartburn you can:   Try not to put a lot of pressure on the sphincter muscle. Eating light meals and wearing loose clothing  will help.   Lose weight if you are overweight.   Take nonprescription antacids (tablets or liquid) after meals and at bedtime.   Raise the head of your bed or use more than one pillow so your head is higher than your stomach. This may allow gravity to help keep food from backing up.   If you find that certain foods or drinks seem to cause your symptoms or make them worse, avoid those foods.   If the simple measures described above do not relieve the symptoms, your healthcare provider may prescribe medicine. The prescription medicines help reduce stomach acid. They also help stomach emptying. A very few people who are not helped with medicines may need surgery.   Get emergency care if the following symptoms occur with the heartburn and do not go away within 15 minutes of treatment for heartburn: shortness of breath; sweating; light-headedness, weakness; or jaw, arm, back, or chest pain.   How long will the effects last?   Heartburn symptoms are usually relieved by treatment in just a few hours or less. If you are having heartburn every day, starting treatment will usually relieve the symptoms in a few days. However, the symptoms may come back from time to time, especially if you gain weight.   Heartburn can sometimes make asthma worse. If you have asthma, preventing or controlling heartburn may help control your asthma symptoms.   How can I help prevent heartburn?   The best prevention is to:   Keep a healthy weight. Lose weight if you are overweight.   Sleep with your head elevated at least 4 to 6 inches. (It's usually most comfortable to put the head of your bed on blocks.)   It may also help if you:   Wait an hour or longer after eating before you lie down. If you have to lie down after a meal, lie on your left side. Keep your head and shoulders slightly higher than the rest of your body. It's best to not eat for 2 to 3 hours before you go to bed.   Eat smaller, more frequent meals.   Avoid wearing tight clothing  or belts.   Don't smoke. Smoking relaxes the sphincter leading to your stomach.   Avoid foods and other things that seem to cause heartburn or make it worse.   Developed by [employer].   Published by [employer].   Last modified: 2009-01-14   Last reviewed: 2008-12-02

## 2017-10-12 ENCOUNTER — MYC MEDICAL ADVICE (OUTPATIENT)
Dept: FAMILY MEDICINE | Facility: CLINIC | Age: 22
End: 2017-10-12

## 2017-10-12 DIAGNOSIS — R10.11 RUQ ABDOMINAL PAIN: Primary | ICD-10-CM

## 2017-10-12 LAB
ALBUMIN SERPL-MCNC: 4.5 G/DL (ref 3.4–5)
ALP SERPL-CCNC: 62 U/L (ref 40–150)
ALT SERPL W P-5'-P-CCNC: 25 U/L (ref 0–50)
ANION GAP SERPL CALCULATED.3IONS-SCNC: 8 MMOL/L (ref 3–14)
AST SERPL W P-5'-P-CCNC: 25 U/L (ref 0–45)
BILIRUB SERPL-MCNC: 1.1 MG/DL (ref 0.2–1.3)
BUN SERPL-MCNC: 14 MG/DL (ref 7–30)
CALCIUM SERPL-MCNC: 9.3 MG/DL (ref 8.5–10.1)
CHLORIDE SERPL-SCNC: 101 MMOL/L (ref 94–109)
CO2 SERPL-SCNC: 25 MMOL/L (ref 20–32)
CREAT SERPL-MCNC: 0.66 MG/DL (ref 0.52–1.04)
GFR SERPL CREATININE-BSD FRML MDRD: >90 ML/MIN/1.7M2
GLUCOSE SERPL-MCNC: 80 MG/DL (ref 70–99)
LIPASE SERPL-CCNC: 169 U/L (ref 73–393)
POTASSIUM SERPL-SCNC: 4.6 MMOL/L (ref 3.4–5.3)
PROT SERPL-MCNC: 8.1 G/DL (ref 6.8–8.8)
SODIUM SERPL-SCNC: 134 MMOL/L (ref 133–144)

## 2017-10-14 ENCOUNTER — HOSPITAL ENCOUNTER (OUTPATIENT)
Dept: ULTRASOUND IMAGING | Facility: CLINIC | Age: 22
Discharge: HOME | End: 2017-10-14
Attending: PHYSICIAN ASSISTANT | Admitting: PHYSICIAN ASSISTANT
Payer: COMMERCIAL

## 2017-10-14 DIAGNOSIS — R10.13 ABDOMINAL PAIN, EPIGASTRIC: ICD-10-CM

## 2017-10-14 DIAGNOSIS — R10.11 ABDOMINAL PAIN, RIGHT UPPER QUADRANT: ICD-10-CM

## 2017-10-14 PROCEDURE — 76700 US EXAM ABDOM COMPLETE: CPT

## 2017-10-19 ENCOUNTER — HOSPITAL ENCOUNTER (OUTPATIENT)
Dept: NUCLEAR MEDICINE | Facility: CLINIC | Age: 22
Setting detail: NUCLEAR MEDICINE
Discharge: HOME | End: 2017-10-19
Attending: PHYSICIAN ASSISTANT | Admitting: PHYSICIAN ASSISTANT
Payer: COMMERCIAL

## 2017-10-19 VITALS — BODY MASS INDEX: 35.55 KG/M2 | WEIGHT: 240 LBS

## 2017-10-19 DIAGNOSIS — R10.11 RUQ ABDOMINAL PAIN: ICD-10-CM

## 2017-10-19 PROCEDURE — A9537 TC99M MEBROFENIN: HCPCS | Performed by: PHYSICIAN ASSISTANT

## 2017-10-19 PROCEDURE — 78227 HEPATOBIL SYST IMAGE W/DRUG: CPT

## 2017-10-19 PROCEDURE — 25000128 H RX IP 250 OP 636: Performed by: PHYSICIAN ASSISTANT

## 2017-10-19 PROCEDURE — 34300033 ZZH RX 343: Performed by: PHYSICIAN ASSISTANT

## 2017-10-19 RX ORDER — KIT FOR THE PREPARATION OF TECHNETIUM TC 99M MEBROFENIN 45 MG/10ML
6.5 INJECTION, POWDER, LYOPHILIZED, FOR SOLUTION INTRAVENOUS ONCE
Status: COMPLETED | OUTPATIENT
Start: 2017-10-19 | End: 2017-10-19

## 2017-10-19 RX ADMIN — SODIUM CHLORIDE 2.2 MCG: 9 INJECTION INTRAMUSCULAR; INTRAVENOUS; SUBCUTANEOUS at 09:30

## 2017-10-19 RX ADMIN — MEBROFENIN 6.5 MILLICURIE: 45 INJECTION, POWDER, LYOPHILIZED, FOR SOLUTION INTRAVENOUS at 08:25

## 2017-10-24 ENCOUNTER — OFFICE VISIT (OUTPATIENT)
Dept: SURGERY | Facility: CLINIC | Age: 22
End: 2017-10-24
Payer: COMMERCIAL

## 2017-10-24 ENCOUNTER — ANESTHESIA EVENT (OUTPATIENT)
Dept: SURGERY | Facility: CLINIC | Age: 22
End: 2017-10-24
Payer: COMMERCIAL

## 2017-10-24 VITALS
DIASTOLIC BLOOD PRESSURE: 78 MMHG | WEIGHT: 240 LBS | BODY MASS INDEX: 35.55 KG/M2 | HEIGHT: 69 IN | HEART RATE: 78 BPM | TEMPERATURE: 98.3 F | SYSTOLIC BLOOD PRESSURE: 115 MMHG

## 2017-10-24 DIAGNOSIS — K82.8 BILIARY DYSKINESIA: Primary | ICD-10-CM

## 2017-10-24 PROCEDURE — 99204 OFFICE O/P NEW MOD 45 MIN: CPT | Mod: 57 | Performed by: SURGERY

## 2017-10-24 NOTE — LETTER
SURGERYPLANNING/SCHEDULING WORKSHEET                              [facility]  [address] [facility] [place] [place] [address] [phone]                          Ramila Kaye                :  1995  MRN:  [id number]  Phone: [phone] (home)     Same Day Surgery   Surgeon: Frank Servin MD  Diagnosis:   Biliary dyskinesia  Allergies:  Review of patient's allergies indicates no known allergies.   A preoperative evaluation and physical will be done by this office.   ====================================================  Surgical Procedure:  General Surgery:laparoscopic cholecystectomy  Length of Procedure:  45  Type of anesthesia:  General  The proposed surgical procedure is considered INTERMEDIATE risk.  Date of Procedure:___10/25/17_____    Time: ___Noon__________       Special Equipment: None  Informed Consent Obtained and Signed:  NO  ====================================================  Instructions to Same Day Surgery Staff  JAZZY Stockings Knee High  Preop Antibiotic:  Ancef 2 gm IV  pre-op within one hour prior to incision For > 80 kg  Preop Pain Meds:  None  Preop Orders:  Routine Standing Orders.  ====================================================  Instructions to the patient:  Preop physical exam scheduled (within 30 days or 7 days prior) with:  Dr. Atwood_____________  Clinic:  ____________________                                         Date______________Time_________________________  Come to the hospital at: __[facility] will call with arrival time___________  HOME PREPARATION:   Shower with Hibiclens the night before or the morning of surgery, gently cleaning skin from neck to feet  Bathe and brush teeth the morning of surgery.  Take medications with a sip of water the morning of surgery:   Check with  if taking insulin.  May have  a light meal, toast and clear liquids, up to 8 hrs before surgery  May have clear liquids  (liquids one can read through) up to 4 hrs before surgery  NOTHING after 4 hrs before surgery  Stop aspirin 7-10 days before surgery  Stop NSAIDS (Ibuproven, Naproxen, etc) 5 days before surgery  Stop Plavix 7-10 days before surgery  Need to arrange for a     Frank Servin MD    10/24/2017  This form was electronically signed at chart closure                                                                        Chart Copy

## 2017-10-24 NOTE — NURSING NOTE
"Initial /78 (BP Location: Right arm, Patient Position: Chair, Cuff Size: Adult Large)  Pulse 78  Temp 98.3  F (36.8  C) (Oral)  Ht 1.75 m (5' 8.9\")  Wt 108.9 kg (240 lb)  LMP 09/30/2017  BMI 35.54 kg/m2 Estimated body mass index is 35.54 kg/(m^2) as calculated from the following:    Height as of this encounter: 1.75 m (5' 8.9\").    Weight as of this encounter: 108.9 kg (240 lb). .    Yvonne Schmidt MA    "

## 2017-10-24 NOTE — MR AVS SNAPSHOT
After Visit Summary   10/24/2017    Ramila Kaye    MRN: [id number]           Patient Information     Date Of Birth          1995        Visit Information        Provider Department      10/24/2017 1:30 PM Frank Servin MD [facility]        Today's Diagnoses     Biliary dyskinesia    -  1      Care Instructions    Per Dr. Servin's intructions          Follow-ups after your visit        Your next 10 appointments already scheduled     Oct 25, 2017   Procedure with Frank Servin MD   [facility] (--)    [address] [address] [phone]           The medical center is located at [address] (between [address] in [place], [place] north of [place]).              Who to contact     If you have questions or need follow up information about today's clinic visit or your schedule please contact [facility] directly at [phone].  Normal or non-critical lab and imaging results will be communicated to you by [employer]art, letter or phone within 4 business days after the clinic has received the results. If you do not hear from us within 7 days, please contact the clinic through [employer] or phone. If you have a critical or abnormal lab result, we will notify you by phone as soon as possible.  Submit refill requests through [employer] or call your pharmacy and they will forward the refill request to us. Please allow 3 business days for your refill to be completed.          Additional Information About Your Visit        Wal[employer]KitharDatahug Information     [employer] gives you secure access to your electronic health record. If you see a primary care provider, you can also send messages to your care team and make appointments. If you have questions, please call your primary care clinic.  If you do not have a primary care provider, please call [phone] and they will assist you.        Care EveryWhere ID     This is your  "Care EveryWhere ID. This could be used by other organizations to access your [place] medical records  [id number]        Your Vitals Were     Pulse Temperature Height Last Period BMI (Body Mass Index)       78 98.3  F (36.8  C) (Oral) 1.75 m (5' 8.9\") 09/30/2017 35.54 kg/m2        Blood Pressure from Last 3 Encounters:   10/24/17 115/78   10/11/17 125/78   09/18/17 114/68    Weight from Last 3 Encounters:   10/24/17 108.9 kg (240 lb)   10/19/17 108.9 kg (240 lb)   10/11/17 110 kg (242 lb 6.4 oz)              Today, you had the following     No orders found for display       Primary Care Provider Office Phone # Fax #    Gail Hyatt PA-C [phone] [address] [facility] [address]        Equal Access to Services     IVANNA EUBANKS : Hadii amada Escalante, waaxda luacadaha, qaybta kaalmada adekaliyada, bri downey . So [facility] [phone].    ATENCIÓN: Si habla español, tiene a regalado disposición servicios gratuitos de asistencia lingüística. Llame al [phone].    We comply with applicable federal civil rights laws and [place] laws. We do not discriminate on the basis of race, color, national origin, age, disability, sex, sexual orientation, or gender identity.            Thank you!     Thank you for choosing [facility]  for your care. Our goal is always to provide you with excellent care. Hearing back from our patients is one way we can continue to improve our services. Please take a few minutes to complete the written survey that you may receive in the mail after your visit with us. Thank you!             Your Updated Medication List - Protect others around you: Learn how to safely use, store and throw away your medicines at www.disposemymeds.org.      Notice  As of 10/24/2017 11:59 PM    You have not been prescribed any medications.      "

## 2017-10-25 ENCOUNTER — ANESTHESIA (OUTPATIENT)
Dept: SURGERY | Facility: CLINIC | Age: 22
End: 2017-10-25
Payer: COMMERCIAL

## 2017-10-25 ENCOUNTER — HOSPITAL ENCOUNTER (OUTPATIENT)
Facility: CLINIC | Age: 22
Discharge: HOME | End: 2017-10-25
Attending: SURGERY | Admitting: SURGERY
Payer: COMMERCIAL

## 2017-10-25 ENCOUNTER — SURGERY (OUTPATIENT)
Age: 22
End: 2017-10-25

## 2017-10-25 VITALS
RESPIRATION RATE: 16 BRPM | SYSTOLIC BLOOD PRESSURE: 112 MMHG | OXYGEN SATURATION: 96 % | TEMPERATURE: 99.9 F | DIASTOLIC BLOOD PRESSURE: 55 MMHG | HEART RATE: 90 BPM

## 2017-10-25 DIAGNOSIS — G89.18 POST-OP PAIN: Primary | ICD-10-CM

## 2017-10-25 LAB — HCG UR QL: NEGATIVE

## 2017-10-25 PROCEDURE — 47562 LAPAROSCOPIC CHOLECYSTECTOMY: CPT | Performed by: SURGERY

## 2017-10-25 PROCEDURE — 25000564 ZZH DESFLURANE, EA 15 MIN: Performed by: SURGERY

## 2017-10-25 PROCEDURE — 81025 URINE PREGNANCY TEST: CPT | Performed by: NURSE ANESTHETIST, CERTIFIED REGISTERED

## 2017-10-25 PROCEDURE — 88304 TISSUE EXAM BY PATHOLOGIST: CPT | Mod: 26 | Performed by: SURGERY

## 2017-10-25 PROCEDURE — 25000128 H RX IP 250 OP 636: Performed by: NURSE ANESTHETIST, CERTIFIED REGISTERED

## 2017-10-25 PROCEDURE — 71000014 ZZH RECOVERY PHASE 1 LEVEL 2 FIRST HR: Performed by: SURGERY

## 2017-10-25 PROCEDURE — 27110028 ZZH OR GENERAL SUPPLY NON-STERILE: Performed by: SURGERY

## 2017-10-25 PROCEDURE — 25000125 ZZHC RX 250: Performed by: NURSE ANESTHETIST, CERTIFIED REGISTERED

## 2017-10-25 PROCEDURE — 88304 TISSUE EXAM BY PATHOLOGIST: CPT | Performed by: SURGERY

## 2017-10-25 PROCEDURE — 27210794 ZZH OR GENERAL SUPPLY STERILE: Performed by: SURGERY

## 2017-10-25 PROCEDURE — 25000125 ZZHC RX 250: Performed by: SURGERY

## 2017-10-25 PROCEDURE — 36000056 ZZH SURGERY LEVEL 3 1ST 30 MIN: Performed by: SURGERY

## 2017-10-25 PROCEDURE — 71000027 ZZH RECOVERY PHASE 2 EACH 15 MINS: Performed by: SURGERY

## 2017-10-25 PROCEDURE — 37000008 ZZH ANESTHESIA TECHNICAL FEE, 1ST 30 MIN: Performed by: SURGERY

## 2017-10-25 PROCEDURE — 25000128 H RX IP 250 OP 636: Performed by: SURGERY

## 2017-10-25 PROCEDURE — 25000132 ZZH RX MED GY IP 250 OP 250 PS 637: Performed by: SURGERY

## 2017-10-25 PROCEDURE — 47562 LAPAROSCOPIC CHOLECYSTECTOMY: CPT | Mod: AS | Performed by: PHYSICIAN ASSISTANT

## 2017-10-25 PROCEDURE — 37000009 ZZH ANESTHESIA TECHNICAL FEE, EACH ADDTL 15 MIN: Performed by: SURGERY

## 2017-10-25 PROCEDURE — 36000058 ZZH SURGERY LEVEL 3 EA 15 ADDTL MIN: Performed by: SURGERY

## 2017-10-25 PROCEDURE — 40000305 ZZH STATISTIC PRE PROC ASSESS I: Performed by: SURGERY

## 2017-10-25 RX ORDER — DEXAMETHASONE SODIUM PHOSPHATE 4 MG/ML
INJECTION, SOLUTION INTRA-ARTICULAR; INTRALESIONAL; INTRAMUSCULAR; INTRAVENOUS; SOFT TISSUE PRN
Status: DISCONTINUED | OUTPATIENT
Start: 2017-10-25 | End: 2017-10-25

## 2017-10-25 RX ORDER — NALOXONE HYDROCHLORIDE 0.4 MG/ML
.1-.4 INJECTION, SOLUTION INTRAMUSCULAR; INTRAVENOUS; SUBCUTANEOUS
Status: DISCONTINUED | OUTPATIENT
Start: 2017-10-25 | End: 2017-10-25 | Stop reason: HOSPADM

## 2017-10-25 RX ORDER — FENTANYL CITRATE 50 UG/ML
25-50 INJECTION, SOLUTION INTRAMUSCULAR; INTRAVENOUS
Status: DISCONTINUED | OUTPATIENT
Start: 2017-10-25 | End: 2017-10-25 | Stop reason: HOSPADM

## 2017-10-25 RX ORDER — GLYCOPYRROLATE 0.2 MG/ML
INJECTION, SOLUTION INTRAMUSCULAR; INTRAVENOUS PRN
Status: DISCONTINUED | OUTPATIENT
Start: 2017-10-25 | End: 2017-10-25

## 2017-10-25 RX ORDER — SODIUM CHLORIDE, SODIUM LACTATE, POTASSIUM CHLORIDE, CALCIUM CHLORIDE 600; 310; 30; 20 MG/100ML; MG/100ML; MG/100ML; MG/100ML
INJECTION, SOLUTION INTRAVENOUS CONTINUOUS
Status: DISCONTINUED | OUTPATIENT
Start: 2017-10-25 | End: 2017-10-25 | Stop reason: HOSPADM

## 2017-10-25 RX ORDER — ONDANSETRON 2 MG/ML
INJECTION INTRAMUSCULAR; INTRAVENOUS PRN
Status: DISCONTINUED | OUTPATIENT
Start: 2017-10-25 | End: 2017-10-25

## 2017-10-25 RX ORDER — HYDROMORPHONE HYDROCHLORIDE 1 MG/ML
.3-.5 INJECTION, SOLUTION INTRAMUSCULAR; INTRAVENOUS; SUBCUTANEOUS EVERY 10 MIN PRN
Status: DISCONTINUED | OUTPATIENT
Start: 2017-10-25 | End: 2017-10-25 | Stop reason: HOSPADM

## 2017-10-25 RX ORDER — ONDANSETRON 4 MG/1
4 TABLET, ORALLY DISINTEGRATING ORAL EVERY 30 MIN PRN
Status: DISCONTINUED | OUTPATIENT
Start: 2017-10-25 | End: 2017-10-25 | Stop reason: HOSPADM

## 2017-10-25 RX ORDER — MEPERIDINE HYDROCHLORIDE 25 MG/ML
12.5 INJECTION INTRAMUSCULAR; INTRAVENOUS; SUBCUTANEOUS
Status: DISCONTINUED | OUTPATIENT
Start: 2017-10-25 | End: 2017-10-25 | Stop reason: HOSPADM

## 2017-10-25 RX ORDER — ONDANSETRON 4 MG/1
4-8 TABLET, ORALLY DISINTEGRATING ORAL EVERY 8 HOURS PRN
Qty: 20 TABLET | Refills: 0 | Status: SHIPPED | OUTPATIENT
Start: 2017-10-25 | End: 2018-03-16

## 2017-10-25 RX ORDER — CEFAZOLIN SODIUM 1 G/3ML
1 INJECTION, POWDER, FOR SOLUTION INTRAMUSCULAR; INTRAVENOUS SEE ADMIN INSTRUCTIONS
Status: DISCONTINUED | OUTPATIENT
Start: 2017-10-25 | End: 2017-10-25 | Stop reason: HOSPADM

## 2017-10-25 RX ORDER — LIDOCAINE HYDROCHLORIDE 10 MG/ML
INJECTION, SOLUTION EPIDURAL; INFILTRATION; INTRACAUDAL; PERINEURAL PRN
Status: DISCONTINUED | OUTPATIENT
Start: 2017-10-25 | End: 2017-10-25

## 2017-10-25 RX ORDER — BUPIVACAINE HYDROCHLORIDE AND EPINEPHRINE 2.5; 5 MG/ML; UG/ML
INJECTION, SOLUTION INFILTRATION; PERINEURAL PRN
Status: DISCONTINUED | OUTPATIENT
Start: 2017-10-25 | End: 2017-10-25 | Stop reason: HOSPADM

## 2017-10-25 RX ORDER — NEOSTIGMINE METHYLSULFATE 1 MG/ML
VIAL (ML) INJECTION PRN
Status: DISCONTINUED | OUTPATIENT
Start: 2017-10-25 | End: 2017-10-25

## 2017-10-25 RX ORDER — KETOROLAC TROMETHAMINE 30 MG/ML
INJECTION, SOLUTION INTRAMUSCULAR; INTRAVENOUS PRN
Status: DISCONTINUED | OUTPATIENT
Start: 2017-10-25 | End: 2017-10-25

## 2017-10-25 RX ORDER — HYDROCODONE BITARTRATE AND ACETAMINOPHEN 5; 325 MG/1; MG/1
1-2 TABLET ORAL
Status: COMPLETED | OUTPATIENT
Start: 2017-10-25 | End: 2017-10-25

## 2017-10-25 RX ORDER — FENTANYL CITRATE 50 UG/ML
INJECTION, SOLUTION INTRAMUSCULAR; INTRAVENOUS PRN
Status: DISCONTINUED | OUTPATIENT
Start: 2017-10-25 | End: 2017-10-25

## 2017-10-25 RX ORDER — HYDROCODONE BITARTRATE AND ACETAMINOPHEN 5; 325 MG/1; MG/1
1-2 TABLET ORAL EVERY 4 HOURS PRN
Qty: 30 TABLET | Refills: 0 | Status: SHIPPED | OUTPATIENT
Start: 2017-10-25 | End: 2018-03-16

## 2017-10-25 RX ORDER — ONDANSETRON 2 MG/ML
4 INJECTION INTRAMUSCULAR; INTRAVENOUS EVERY 30 MIN PRN
Status: DISCONTINUED | OUTPATIENT
Start: 2017-10-25 | End: 2017-10-25 | Stop reason: HOSPADM

## 2017-10-25 RX ORDER — PROPOFOL 10 MG/ML
INJECTION, EMULSION INTRAVENOUS PRN
Status: DISCONTINUED | OUTPATIENT
Start: 2017-10-25 | End: 2017-10-25

## 2017-10-25 RX ORDER — CEFAZOLIN SODIUM 2 G/100ML
2 INJECTION, SOLUTION INTRAVENOUS
Status: COMPLETED | OUTPATIENT
Start: 2017-10-25 | End: 2017-10-25

## 2017-10-25 RX ORDER — ALBUTEROL SULFATE 0.83 MG/ML
2.5 SOLUTION RESPIRATORY (INHALATION) EVERY 4 HOURS PRN
Status: DISCONTINUED | OUTPATIENT
Start: 2017-10-25 | End: 2017-10-25 | Stop reason: HOSPADM

## 2017-10-25 RX ADMIN — FENTANYL CITRATE 100 MCG: 50 INJECTION, SOLUTION INTRAMUSCULAR; INTRAVENOUS at 12:25

## 2017-10-25 RX ADMIN — HYDROCODONE BITARTRATE AND ACETAMINOPHEN 2 TABLET: 5; 325 TABLET ORAL at 13:35

## 2017-10-25 RX ADMIN — LIDOCAINE HYDROCHLORIDE 50 MG: 10 INJECTION, SOLUTION EPIDURAL; INFILTRATION; INTRACAUDAL; PERINEURAL at 12:25

## 2017-10-25 RX ADMIN — ROCURONIUM BROMIDE 30 MG: 10 INJECTION INTRAVENOUS at 12:25

## 2017-10-25 RX ADMIN — GLYCOPYRROLATE 0.5 MG: 0.2 INJECTION, SOLUTION INTRAMUSCULAR; INTRAVENOUS at 13:00

## 2017-10-25 RX ADMIN — MIDAZOLAM HYDROCHLORIDE 2 MG: 1 INJECTION, SOLUTION INTRAMUSCULAR; INTRAVENOUS at 12:19

## 2017-10-25 RX ADMIN — HYDROMORPHONE HYDROCHLORIDE 0.5 MG: 1 INJECTION, SOLUTION INTRAMUSCULAR; INTRAVENOUS; SUBCUTANEOUS at 13:36

## 2017-10-25 RX ADMIN — ONDANSETRON 4 MG: 2 INJECTION INTRAMUSCULAR; INTRAVENOUS at 12:25

## 2017-10-25 RX ADMIN — KETOROLAC TROMETHAMINE 30 MG: 30 INJECTION, SOLUTION INTRAMUSCULAR at 12:46

## 2017-10-25 RX ADMIN — SODIUM CHLORIDE, POTASSIUM CHLORIDE, SODIUM LACTATE AND CALCIUM CHLORIDE: 600; 310; 30; 20 INJECTION, SOLUTION INTRAVENOUS at 11:25

## 2017-10-25 RX ADMIN — FENTANYL CITRATE 100 MCG: 50 INJECTION, SOLUTION INTRAMUSCULAR; INTRAVENOUS at 12:46

## 2017-10-25 RX ADMIN — SODIUM CHLORIDE, POTASSIUM CHLORIDE, SODIUM LACTATE AND CALCIUM CHLORIDE: 600; 310; 30; 20 INJECTION, SOLUTION INTRAVENOUS at 13:08

## 2017-10-25 RX ADMIN — Medication 3 MG: at 13:00

## 2017-10-25 RX ADMIN — DEXAMETHASONE SODIUM PHOSPHATE 4 MG: 4 INJECTION, SOLUTION INTRA-ARTICULAR; INTRALESIONAL; INTRAMUSCULAR; INTRAVENOUS; SOFT TISSUE at 12:25

## 2017-10-25 RX ADMIN — BUPIVACAINE HYDROCHLORIDE AND EPINEPHRINE BITARTRATE 40 ML: 2.5; .005 INJECTION, SOLUTION INFILTRATION; PERINEURAL at 13:02

## 2017-10-25 RX ADMIN — LIDOCAINE HYDROCHLORIDE 1 ML: 10 INJECTION, SOLUTION EPIDURAL; INFILTRATION; INTRACAUDAL; PERINEURAL at 11:26

## 2017-10-25 RX ADMIN — PROPOFOL 200 MG: 10 INJECTION, EMULSION INTRAVENOUS at 12:25

## 2017-10-25 RX ADMIN — FENTANYL CITRATE 100 MCG: 50 INJECTION, SOLUTION INTRAMUSCULAR; INTRAVENOUS at 12:24

## 2017-10-25 RX ADMIN — GLYCOPYRROLATE 0.2 MG: 0.2 INJECTION, SOLUTION INTRAMUSCULAR; INTRAVENOUS at 12:25

## 2017-10-25 RX ADMIN — ROCURONIUM BROMIDE 10 MG: 10 INJECTION INTRAVENOUS at 12:24

## 2017-10-25 RX ADMIN — FENTANYL CITRATE 50 MCG: 50 INJECTION, SOLUTION INTRAMUSCULAR; INTRAVENOUS at 12:20

## 2017-10-25 RX ADMIN — HYDROMORPHONE HYDROCHLORIDE 0.5 MG: 1 INJECTION, SOLUTION INTRAMUSCULAR; INTRAVENOUS; SUBCUTANEOUS at 13:26

## 2017-10-25 RX ADMIN — CEFAZOLIN SODIUM 2 G: 2 INJECTION, SOLUTION INTRAVENOUS at 12:19

## 2017-10-25 NOTE — ANESTHESIA POSTPROCEDURE EVALUATION
Patient: Ramila Kaye    Procedure(s):  Laparoscopic Cholecystectomy - Wound Class: II-Clean Contaminated    Diagnosis:biliary dyskinesia  Diagnosis Additional Information: No value filed.    Anesthesia Type:  General, ETT    Note:  Anesthesia Post Evaluation    Patient location during evaluation: Bedside  Patient participation: Able to fully participate in evaluation  Level of consciousness: awake and alert  Pain management: adequate  Airway patency: patent  Cardiovascular status: acceptable  Respiratory status: acceptable  Hydration status: acceptable  PONV: none     Anesthetic complications: None          Last vitals:  Vitals:    10/25/17 1351 10/25/17 1407 10/25/17 1415   BP: 110/60 104/62 114/63   Pulse: 90     Resp: 16 16 16   Temp:      SpO2: 96% 96% 94%         Electronically Signed By: Ana M Roberto CRNA, APRN RENATA  October 25, 2017  2:23 PM

## 2017-10-25 NOTE — ANESTHESIA PREPROCEDURE EVALUATION
Anesthesia Evaluation     . Pt has not had prior anesthetic            ROS/MED HX    ENT/Pulmonary:  - neg pulmonary ROS     Neurologic:  - neg neurologic ROS     Cardiovascular:  - neg cardiovascular ROS       METS/Exercise Tolerance:  >4 METS   Hematologic:  - neg hematologic  ROS       Musculoskeletal:  - neg musculoskeletal ROS       GI/Hepatic:  - neg GI/hepatic ROS       Renal/Genitourinary:  - ROS Renal section negative       Endo:  - neg endo ROS       Psychiatric:  - neg psychiatric ROS       Infectious Disease:  - neg infectious disease ROS       Malignancy:      - no malignancy   Other:    (+) No chance of pregnancy                    Physical Exam  Normal systems: cardiovascular, pulmonary and dental    Airway   Mallampati: II  TM distance: >3 FB  Neck ROM: full    Dental     Cardiovascular       Pulmonary                     Anesthesia Plan      History & Physical Review  History and physical reviewed and following examination; no interval change.    ASA Status:  2 .    NPO Status:  > 6 hours    Plan for General and ETT with Intravenous induction. Maintenance will be Balanced.    PONV prophylaxis:  Ondansetron (or other 5HT-3) and Dexamethasone or Solumedrol  Additional equipment: Videolaryngoscope      Postoperative Care  Postoperative pain management:  IV analgesics and Oral pain medications.      Consents  Anesthetic plan, risks, benefits and alternatives discussed with:  Patient..                          .

## 2017-10-25 NOTE — IP AVS SNAPSHOT
[facility] PreOP/Phase II    [address] [address]    Phone:  [phone]    Fax:  [phone]                                       After Visit Summary   10/25/2017    Ramila Kaye    MRN: [id number]           After Visit Summary Signature Page     I have received my discharge instructions, and my questions have been answered. I have discussed any challenges I see with this plan with the nurse or doctor.    ..........................................................................................................................................  Patient/Patient Representative Signature      ..........................................................................................................................................  Patient Representative Print Name and Relationship to Patient    ..................................................               ................................................  Date                                            Time    ..........................................................................................................................................  Reviewed by Signature/Title    ...................................................              ..............................................  Date                                                            Time

## 2017-10-25 NOTE — PROGRESS NOTES
Asked by Gail Hyatt to see this patient with RUQ pain with eating.  She has had this for a few months.  She also has nausea with the discomfort.  she is unable to take deep breaths due to the pain.  She was started on prilosec and zantac and did not get relief.  She had an U/S that was normal and a HIDA scan that had normal ejection fraction but her symptoms were reproduced with the CCK injection.    Past Medical History:   Diagnosis Date     NO ACTIVE PROBLEMS      Past Surgical History:   Procedure Laterality Date     NO HISTORY OF SURGERY       Family History   Problem Relation Age of Onset     Coronary Artery Disease Maternal Grandmother      Glaucoma Paternal Grandmother      GERD Brother      Social History     Social History     Marital status: Single     Spouse name: N/A     Number of children: N/A     Years of education: N/A     Occupational History     Not on file.     Social History Main Topics     Smoking status: Never Smoker     Smokeless tobacco: Never Used     Alcohol use Yes     Drug use: No     Sexual activity: No      Comment: never sexuality active 2/22/17     Other Topics Concern     Parent/Sibling W/ Cabg, Mi Or Angioplasty Before 65f 55m? No     Social History Narrative     No current outpatient prescriptions on file.     No current facility-administered medications for this visit.       No Known Allergies     ROS: 10 point ROS neg other than the symptoms noted above in the HPI.    Physical Exam   Constitutional: She is oriented to person, place, and time and well-developed, well-nourished, and in no distress. No distress.   HENT:   Head: Normocephalic and atraumatic.   Eyes: EOM are normal.   Neck: Normal range of motion. No tracheal deviation present.   Cardiovascular: Normal rate, regular rhythm and normal heart sounds.    Pulmonary/Chest: Effort normal and breath sounds normal. No respiratory distress.   Abdominal: Soft. There is no tenderness.   Her discomfort is usually in the RUQ  radiating to the back.   Neurological: She is alert and oriented to person, place, and time.   Skin: Skin is warm and dry. She is not diaphoretic.   Psychiatric: Mood, memory, affect and judgment normal.     Her HIDA scan and U/S were reviewed.    A/P:  Biliary dyskinesia.  I discussed laparoscopic cholecystectomy with her.   Risks and benefits were explained, including but not limited to bleeding, infection, and anesthesia. She seems to understand and consented to proceed with the procedure. Surgery will be scheduled as soon as possible.    She is cleared for anesthesia.    Cipriano Servin MD, FACS

## 2017-10-25 NOTE — IP AVS SNAPSHOT
MRN:[id number]                      After Visit Summary   10/25/2017    Ramila Kaye    MRN: [id number]           Thank you!     Thank you for choosing [facility] for your care. Our goal is always to provide you with excellent care. Hearing back from our patients is one way we can continue to improve our services. Please take a few minutes to complete the written survey that you may receive in the mail after you visit with us. Thank you!        Patient Information     Date Of Birth          1995        About your hospital stay     You were admitted on:  October 25, 2017 You last received care in the:  [facility] PreOP/Phase II    You were discharged on:  October 25, 2017       Who to Call     For medical emergencies, please call 911.  For non-urgent questions about your medical care, please call your primary care provider or clinic, [phone]  For questions related to your surgery, please call your surgery clinic        Attending Provider     Provider Specialty    Frank Servin MD General Surgery       Primary Care Provider Office Phone # Fax #    Gail Hyatt PA-C [phone]      After Care Instructions     Diet Instructions       Resume pre-procedure diet            Discharge Instructions       Patient to follow up with appointment in 1-2 weeks            No Alcohol       For 24 hours post procedure            No driving or operating machinery        until the day after procedure            Shower       No shower for 24 hours post procedure. May shower Postoperative Day (POD)  1            Weight bearing status - As tolerated                 Further instructions from your care team                         Same Day Surgery Discharge Instructions  Special Precautions After Surgery - Adult    1. It is not unusual to feel lightheaded or faint, up to 24 hours after surgery or while taking pain medication.  If you have these symptoms; sit for a few minutes  before standing and have someone assist you when getting up.  2. You should rest and relax for the next 24 hours and must have someone stay with you for at least 24 hours after your discharge.  3. DO NOT DRIVE any vehicle or operate mechanical equipment for 24 hours following the end of your surgery.  DO NOT DRIVE while taking narcotic pain medications that have been prescribed by your physician.  If you had a limb operated on, you must be able to use it fully to drive.  4. DO NOT drink alcoholic beverages for 24 hours following surgery or while taking prescription pain medication.  5. Drink clear liquids (apple juice, ginger ale, broth, 7-Up, etc.).  Progress to your regular diet as you feel able.  6. Any questions call your physician and do not make important decisions for 24 hours.      Surgery Specialty Clinic:  [phone]     Same Day Surgery [phone], Monday thru Friday 6am-9pm.    Break through Bleeding  As instructed per Surgeon or Nurse.    Post Op Infection  Be alert for signs of infection: redness, swelling, heat, drainage of pus, and/or elevated temperature.  Contact your surgeon if these occur.    Nausea   If post op nausea occurs, at first rest your stomach for a few hours by eating nothing solid and sipping only clear liquids.  Call your Surgeon if nausea does not resolve in 24 hours.        Pending Results     No orders found from 10/23/2017 to 10/26/2017.            Admission Information     Date & Time Provider Department Dept. Phone    10/25/2017 Frank Servin MD [facility] PreOP/Phase [phone]      Your Vitals Were     Blood Pressure Pulse Temperature Respirations Last Period Pulse Oximetry    114/63 90 99.9  F (37.7  C) (Oral) 16 10/25/2017 (Exact Date) 94%      MyChart Information     [facility] gives you secure access to your electronic health record. If you see a primary care provider, you can also send messages to your care team and make appointments. If you have  questions, please call your primary care clinic.  If you do not have a primary care provider, please call [phone] and they will assist you.        Care EveryWhere ID     This is your Care EveryWhere ID. This could be used by other organizations to access your [facility] medical records  [id number]        Equal Access to Services     IVANNA EUBANKS : Hadii amada ku hadasho Soomaali, waaxda luqadaha, qaybta kaalmada adesinda, bri etienne. So [facility] [phone].    ATENCIÓN: Si habla español, tiene a regalado disposición servicios gratuitos de asistencia lingüística. Jordan al [phone].    We comply with applicable federal civil rights laws and [place] laws. We do not discriminate on the basis of race, color, national origin, age, disability, sex, sexual orientation, or gender identity.               Review of your medicines      START taking        Dose / Directions    HYDROcodone-acetaminophen 5-325 MG per tablet   Commonly known as:  NORCO   Used for:  Post-op pain   Notes to Patient:  You had 2 pain pills at 1:30 pm.        Dose:  1-2 tablet   Take 1-2 tablets by mouth every 4 hours as needed for other (Moderate to Severe Pain)   Quantity:  30 tablet   Refills:  0       ondansetron 4 MG ODT tab   Commonly known as:  ZOFRAN-ODT   Used for:  Post-op pain   Notes to Patient:  Start when needed.        Dose:  4-8 mg   Take 1-2 tablets (4-8 mg) by mouth every 8 hours as needed for nausea Dissolve ON the tongue.   Quantity:  20 tablet   Refills:  0            Where to get your medicines      These medications were sent to [facility] [place], [place] - [address]  [address] [address]     Phone:  [phone]     ondansetron 4 MG ODT tab         Some of these will need a paper prescription and others can be bought over the counter. Ask your nurse if you have questions.     Bring a paper prescription for each of these medications      HYDROcodone-acetaminophen 5-325 MG per tablet                Protect others around you: Learn how to safely use, store and throw away your medicines at www.disposemymeds.org.             Medication List: This is a list of all your medications and when to take them. Check marks below indicate your daily home schedule. Keep this list as a reference.      Medications           Morning Afternoon Evening Bedtime As Needed    HYDROcodone-acetaminophen 5-325 MG per tablet   Commonly known as:  NORCO   Take 1-2 tablets by mouth every 4 hours as needed for other (Moderate to Severe Pain)   Last time this was given:  2 tablets on 10/25/2017  1:35 PM   Notes to Patient:  You had 2 pain pills at 1:30 pm.                                ondansetron 4 MG ODT tab   Commonly known as:  ZOFRAN-ODT   Take 1-2 tablets (4-8 mg) by mouth every 8 hours as needed for nausea Dissolve ON the tongue.   Notes to Patient:  Start when needed.

## 2017-10-25 NOTE — OP NOTE
Pre op diagnosis: Biliary dyskinesia   Post op diagnosis: Same  Procedure:   Laparoscopic cholecystectomy  Anesthesia:   GABBIE  Surgeon:   Gareth  Assistant:  UNRULY Last.  His assistance was needed for the camera operation.     Indication for surgery.  This is a 22 year old female with symptomatic biliary disease.  I discussed lap magdalena with her.  Risks and benefits were explained, including but not limited to bleeding, infection, and anesthesia. She seems to understand and consented to proceed with the procedure.     Under general anesthesia, the patient's abdomen was prepped and draped in the usual manner.  1% lidocaine was used to infiltrate above the umbilicus.  An incision was made and the veress needle was placed in the abdomen.  The abdomen was insufflated to a constant pressure and zero flow.  A 5 mm port was placed in this incision and a 5 mm 30% camera was placed.   The patient was positioned head up and right side up.  Three other ports were placed in the abdomen after infiltration of the incision sites in the right subcostal area, a 10 mm in the mid epigastric area, two 5 mm ports in the midclavicular site and the anterior axillary line.      The gallbladder was retracted superiorly and the neck retracted laterally.  The cystic duct was isolated and clips were placed.  The duct was divided with scissors.  The cystic artery was then isolated and clips were placed.  The artery was then divided with scissors.  The gallbladder was removed from the gallbladder bed using electrocautery and removed from the abdomen through the upper midline incision.    The abdomen was irrigated with saline and the fluid was evacuated.  The liver bed was inspected for hemostatis.  The upper midline port was removed and the incision was closed with a deep fascial stitch closure and all the other ports were removed under direct vision and the skin closed with 4-0 subcuticular vicryl sutures.  Dermabond was applied.  The  patient tolerated the procedure well and went to PACU in stable condition.    Cipriano Servin MD, FACS

## 2017-10-25 NOTE — ANESTHESIA CARE TRANSFER NOTE
Patient: Ramila Kaye    Procedure(s):  Laparoscopic Cholecystectomy - Wound Class: II-Clean Contaminated    Diagnosis: biliary dyskinesia  Diagnosis Additional Information: No value filed.    Anesthesia Type:   General, ETT     Note:  Airway :Nasal Cannula  Patient transferred to:PACU  Handoff Report: Identifed the Patient, Identified the Reponsible Provider, Reviewed the pertinent medical history, Discussed the surgical course, Reviewed Intra-OP anesthesia mangement and issues during anesthesia, Set expectations for post-procedure period and Allowed opportunity for questions and acknowledgement of understanding      Vitals: (Last set prior to Anesthesia Care Transfer)    CRNA VITALS  10/25/2017 1248 - 10/25/2017 1325      10/25/2017             Pulse: 112    SpO2: 100 %    Resp Rate (observed): 11                Electronically Signed By: RUI Luna CRNA  October 25, 2017  1:25 PM

## 2017-10-25 NOTE — DISCHARGE INSTRUCTIONS
Same Day Surgery Discharge Instructions  Special Precautions After Surgery - Adult    1. It is not unusual to feel lightheaded or faint, up to 24 hours after surgery or while taking pain medication.  If you have these symptoms; sit for a few minutes before standing and have someone assist you when getting up.  2. You should rest and relax for the next 24 hours and must have someone stay with you for at least 24 hours after your discharge.  3. DO NOT DRIVE any vehicle or operate mechanical equipment for 24 hours following the end of your surgery.  DO NOT DRIVE while taking narcotic pain medications that have been prescribed by your physician.  If you had a limb operated on, you must be able to use it fully to drive.  4. DO NOT drink alcoholic beverages for 24 hours following surgery or while taking prescription pain medication.  5. Drink clear liquids (apple juice, ginger ale, broth, 7-Up, etc.).  Progress to your regular diet as you feel able.  6. Any questions call your physician and do not make important decisions for 24 hours.      Surgery Specialty Clinic:  [phone]     Same Day Surgery [phone], Monday thru Friday 6am-9pm.    Break through Bleeding  As instructed per Surgeon or Nurse.    Post Op Infection  Be alert for signs of infection: redness, swelling, heat, drainage of pus, and/or elevated temperature.  Contact your surgeon if these occur.    Nausea   If post op nausea occurs, at first rest your stomach for a few hours by eating nothing solid and sipping only clear liquids.  Call your Surgeon if nausea does not resolve in 24 hours.

## 2017-10-27 LAB — COPATH REPORT: NORMAL

## 2017-11-07 ENCOUNTER — OFFICE VISIT (OUTPATIENT)
Dept: SURGERY | Facility: CLINIC | Age: 22
End: 2017-11-07
Payer: COMMERCIAL

## 2017-11-07 VITALS
WEIGHT: 240 LBS | HEIGHT: 68 IN | BODY MASS INDEX: 36.37 KG/M2 | TEMPERATURE: 98.2 F | SYSTOLIC BLOOD PRESSURE: 127 MMHG | DIASTOLIC BLOOD PRESSURE: 72 MMHG | HEART RATE: 83 BPM

## 2017-11-07 DIAGNOSIS — Z09 SURGERY FOLLOW-UP EXAMINATION: Primary | ICD-10-CM

## 2017-11-07 PROCEDURE — 99024 POSTOP FOLLOW-UP VISIT: CPT | Performed by: SURGERY

## 2017-11-07 NOTE — NURSING NOTE
"Initial /72 (BP Location: Right arm, Patient Position: Chair, Cuff Size: Adult Regular)  Pulse 83  Temp 98.2  F (36.8  C) (Oral)  Ht 1.727 m (5' 8\")  Wt 108.9 kg (240 lb)  LMP 10/25/2017 (Exact Date)  BMI 36.49 kg/m2 Estimated body mass index is 36.49 kg/(m^2) as calculated from the following:    Height as of this encounter: 1.727 m (5' 8\").    Weight as of this encounter: 108.9 kg (240 lb). .    Yvonne Schmidt MA    "

## 2017-11-07 NOTE — PROGRESS NOTES
Pt returns to see me for a post op visit.  She had a lap magdalena done almost 2 weeks ago.  She is doing well, eating and having BM's and feels that her discomfort that she had prior to surgery have completely resolved.    On examination;  Her incisions are all well healed.    Her pathology showed cholesterolosis.    A/P:  Well post op visit.  She is to return on a PRN basis.    Cipriano Servin MD, FACS

## 2017-11-07 NOTE — MR AVS SNAPSHOT
"              After Visit Summary   11/7/2017    Ramila Kaye    MRN: [id number]           Patient Information     Date Of Birth          1995        Visit Information        Provider Department      11/7/2017 9:45 AM Frank Servin MD [facility]        Today's Diagnoses     Surgery follow-up examination    -  1      Care Instructions    Per Dr. Servin's instructions          Follow-ups after your visit        Who to contact     If you have questions or need follow up information about today's clinic visit or your schedule please contact [facility] directly at [phone].  Normal or non-critical lab and imaging results will be communicated to you by MyChart, letter or phone within 4 business days after the clinic has received the results. If you do not hear from us within 7 days, please contact the clinic through [employer] or phone. If you have a critical or abnormal lab result, we will notify you by phone as soon as possible.  Submit refill requests through [employer] or call your pharmacy and they will forward the refill request to us. Please allow 3 business days for your refill to be completed.          Additional Information About Your Visit        MyChart Information     [facility] gives you secure access to your electronic health record. If you see a primary care provider, you can also send messages to your care team and make appointments. If you have questions, please call your primary care clinic.  If you do not have a primary care provider, please call [phone] and they will assist you.        Care EveryWhere ID     This is your Care EveryWhere ID. This could be used by other organizations to access your [facility] medical records  [id number]        Your Vitals Were     Pulse Temperature Height Last Period BMI (Body Mass Index)       83 98.2  F (36.8  C) (Oral) 1.727 m (5' 8\") 10/25/2017 (Exact Date) 36.49 kg/m2        Blood Pressure from Last 3 Encounters: "   11/07/17 127/72   10/25/17 112/55   10/24/17 115/78    Weight from Last 3 Encounters:   11/07/17 108.9 kg (240 lb)   10/24/17 108.9 kg (240 lb)   10/19/17 108.9 kg (240 lb)              Today, you had the following     No orders found for display       Primary Care Provider Office Phone # Fax #    Gail CECILIA Hyatt [phone] [address] [place] [address]        Equal Access to Services     [facility]: Hadii aad ku hadasho Soomaali, waaxda luqadaha, qaybta kaalmada adeegyada, waxchema downey . So [facility] [phone].    ATENCIÓN: Si habla español, tiene a regalado disposición servicios gratuitos de asistencia lingüística. [facility] [phone].    We comply with applicable federal civil rights laws and [place] laws. We do not discriminate on the basis of race, color, national origin, age, disability, sex, sexual orientation, or gender identity.            Thank you!     Thank you for choosing [facility]  for your care. Our goal is always to provide you with excellent care. Hearing back from our patients is one way we can continue to improve our services. Please take a few minutes to complete the written survey that you may receive in the mail after your visit with us. Thank you!             Your Updated Medication List - Protect others around you: Learn how to safely use, store and throw away your medicines at www.disposemymeds.org.          This list is accurate as of: 11/7/17 10:58 AM.  Always use your most recent med list.                   Brand Name Dispense Instructions for use Diagnosis    HYDROcodone-acetaminophen 5-325 MG per tablet    NORCO    30 tablet    Take 1-2 tablets by mouth every 4 hours as needed for other (Moderate to Severe Pain)    Post-op pain       ondansetron 4 MG ODT tab    ZOFRAN-ODT    20 tablet    Take 1-2 tablets (4-8 mg) by mouth every 8 hours as needed for nausea Dissolve ON the tongue.    Post-op pain

## 2017-11-12 ENCOUNTER — HEALTH MAINTENANCE LETTER (OUTPATIENT)
Age: 22
End: 2017-11-12

## 2018-03-13 ENCOUNTER — HOSPITAL ENCOUNTER (EMERGENCY)
Facility: CLINIC | Age: 23
Discharge: HOME | End: 2018-03-13
Attending: PHYSICIAN ASSISTANT | Admitting: PHYSICIAN ASSISTANT
Payer: COMMERCIAL

## 2018-03-13 ENCOUNTER — APPOINTMENT (OUTPATIENT)
Dept: GENERAL RADIOLOGY | Facility: CLINIC | Age: 23
End: 2018-03-13
Attending: PHYSICIAN ASSISTANT
Payer: COMMERCIAL

## 2018-03-13 VITALS
WEIGHT: 225 LBS | HEART RATE: 100 BPM | TEMPERATURE: 98.8 F | BODY MASS INDEX: 33.33 KG/M2 | SYSTOLIC BLOOD PRESSURE: 135 MMHG | DIASTOLIC BLOOD PRESSURE: 76 MMHG | RESPIRATION RATE: 16 BRPM | HEIGHT: 69 IN | OXYGEN SATURATION: 99 %

## 2018-03-13 DIAGNOSIS — M20.012 MALLET FINGER OF LEFT HAND: ICD-10-CM

## 2018-03-13 DIAGNOSIS — S69.92XA FINGER INJURY, LEFT, INITIAL ENCOUNTER: Primary | ICD-10-CM

## 2018-03-13 PROCEDURE — G0463 HOSPITAL OUTPT CLINIC VISIT: HCPCS

## 2018-03-13 PROCEDURE — 73130 X-RAY EXAM OF HAND: CPT | Mod: LT

## 2018-03-13 PROCEDURE — 99213 OFFICE O/P EST LOW 20 MIN: CPT | Performed by: PHYSICIAN ASSISTANT

## 2018-03-13 ASSESSMENT — ENCOUNTER SYMPTOMS
NEUROLOGICAL NEGATIVE: 1
CONSTITUTIONAL NEGATIVE: 1

## 2018-03-13 NOTE — ED AVS SNAPSHOT
[facility]    [address] [facility] [address]    Phone:  [phone]    Fax:  [phone]                                       Ramila Kaye   MRN: [id number]    Department:  [facility]   Date of Visit:  3/13/2018           Patient Information     Date Of Birth          1995        Your diagnoses for this visit were:     Finger injury, left, initial encounter     Mallet finger of left hand        You were seen by Patti Laureano PA-C.      Follow-up Information     Follow up with [facility].    Specialty:  Orthopedics    Why:  For follow-up    Contact information:    [address]  [address]  [facility] [address] [phone]        Follow up with [facility].    Specialty:  EMERGENCY MEDICINE    Why:  As needed, If symptoms worsen    Contact information:    [address] [facility] [address] [phone]    Additional information:    The medical center is located at   [address] [place] [address] (between [facility] and   [place] [address] in [place], [place] north   of [place]).        Discharge Instructions         Mallet Finger  You have an injury to your finger causing the tip of your finger to droop down. This makes your finger look like a small hammer or mallet. This is why it s given this name. It is also called baseball finger. This injury happens when the tendon that holds the finger straight at the last joint tears. Sometimes a small break happens where this tendon attaches to the bone.  This causes local pain, swelling, and bruising. This injury usually takes about 4 to 6 weeks to heal. This injury is often treated with a special finger splint called a stack splint. It holds the tendon in the correct position. But even with right treatment, it may not be possible to fully straighten that joint after the injury heals. After healing, the joint will be stiff but  usually recovers flexibility over time.  Home care    Keep your arm elevated to reduce pain and swelling. When sitting or lying down elevate your arm above the level of your heart. You can do this by placing your arm on a pillow that rests on your chest or on a pillow at your side. This is most important during the first 48 hours after the injury.    Put an ice pack over the injured area for 15 to 20 minutes every 3 to 6 hours. You should do this for the first 24 to 48 hours. You can make an ice pack by filling a plastic bag that seals at the top with ice cubes and then wrapping it with a thin towel. Be careful not to injure your skin with the ice treatments. Ice should never be applied directly to skin. Continue the use of ice packs for relief of pain and swelling as needed. After 48 hours, apply heat (warm shower or warm bath) for 15 to 20 minutes several times a day, or alternate ice and heat.    If a splint was applied, keep it in place for the time advised. If you remove it too soon, it will cause the position of the tendon to change and the finger joint will heal in a bent position.    You may use over-the-counter pain medicine to control pain, unless another pain medicine was prescribed.Talk with your healthcare provider before using these medicines if you have chronic liver or kidney disease or ever had a stomach ulcer or GI bleeding.    Most patients can return to normal activity while wearing the splint. Discuss any limitations with your healthcare provider.  Follow-up care  Follow up with your healthcare provider, or as advised.  If X-rays were taken, you will be told of any new findings that may affect your care.   When to seek medical advice  Call your healthcare provider right away if any of the following occur:    Pain or swelling in the injured finger increases    The finger becomes red or warm     Injured finger becomes cold, blue, numb, or tingly  Date Last Reviewed: 11/23/2015 2000-2017 The  [employer]. [address] [place] [address]. All rights reserved. This information is not intended as a substitute for professional medical care. Always follow your healthcare professional's instructions.          24 Hour Appointment Hotline       To make an appointment at any [facility] clinic, call [phone] ([phone]). If you don't have a family doctor or clinic, we will help you find one. [facility] clinics are conveniently located to serve the needs of you and your family.          ED Discharge Orders     ORTHO  REFERRAL       [facility] is referring you to the Orthopedic  Services at [facility].       The  Representative will assist you in the coordination of your Orthopedic and Musculoskeletal Care as prescribed by your physician.    The  Representative will call you within 1 business day to help schedule your appointment, or you may contact the  Representative at:    All areas ~ [phone]     Type of Referral : Non Surgical       Timeframe requested: Within 1 week    Coverage of these services is subject to the terms and limitations of your health insurance plan.  Please call member services at your health plan with any benefit or coverage questions.      If X-rays, CT or MRI's have been performed, please contact the facility where they were done to arrange for , prior to your scheduled appointment.  Please bring this referral request to your appointment and present it to your specialist.                     Review of your medicines      Our records show that you are taking the medicines listed below. If these are incorrect, please call your family doctor or clinic.        Dose / Directions Last dose taken    HYDROcodone-acetaminophen 5-325 MG per tablet   Commonly known as:  NORCO   Dose:  1-2 tablet   Quantity:  30 tablet        Take 1-2 tablets by mouth every 4 hours as needed for  other (Moderate to Severe Pain)   Refills:  0        ondansetron 4 MG ODT tab   Commonly known as:  ZOFRAN-ODT   Dose:  4-8 mg   Quantity:  20 tablet        Take 1-2 tablets (4-8 mg) by mouth every 8 hours as needed for nausea Dissolve ON the tongue.   Refills:  0                Procedures and tests performed during your visit     Hand XR, G/E 3 views, left      Orders Needing Specimen Collection     None      Pending Results     Date and Time Order Name Status Description    3/13/2018 1851 Hand XR, G/E 3 views, left Preliminary             Pending Culture Results     No orders found from 3/11/2018 to 3/14/2018.            Pending Results Instructions     If you had any lab results that were not finalized at the time of your Discharge, you can call the ED Lab Result RN at [phone]. You will be contacted by this team for any positive Lab results or changes in treatment. The nurses are available 7 days a week from 10A to 6:30P.  You can leave a message 24 hours per day and they will return your call.        Test Results From Your Hospital Stay        3/13/2018  7:10 PM      Narrative     HAND LEFT THREE OR MORE VIEWS  3/13/2018 7:07 PM     COMPARISON: None    HISTORY: Hyperextension injury of small and ring fingers.    FINDINGS: The visualized bones and joint spaces are within normal  limits.        Impression     IMPRESSION: No evidence for fracture, dislocation or significant  degenerative change of the left hand.                Thank you for choosing [facility]       Thank you for choosing [facility] for your care. Our goal is always to provide you with excellent care. Hearing back from our patients is one way we can continue to improve our services. Please take a few minutes to complete the written survey that you may receive in the mail after you visit with us. Thank you!        [facility] Information     [employer] gives you secure access to your electronic health record. If you see a primary care provider, you can  also send messages to your care team and make appointments. If you have questions, please call your primary care clinic.  If you do not have a primary care provider, please call [phone] and they will assist you.        Care EveryWhere ID     This is your Care EveryWhere ID. This could be used by other organizations to access your [facility] medical records  [id number]        Equal Access to Services     IVANNA EUBANKS : Hadii amada Escalante, wajaquelin renee, rosanne kaalmaaudi lucas, bri downey . So [facility] [phone].    ATENCIÓN: Si habla español, tiene a regalado disposición servicios gratuitos de asistencia lingüística. Jordan al [phone].    We comply with applicable federal civil rights laws and [place] laws. We do not discriminate on the basis of race, color, national origin, age, disability, sex, sexual orientation, or gender identity.            After Visit Summary       This is your record. Keep this with you and show to your community pharmacist(s) and doctor(s) at your next visit.

## 2018-03-13 NOTE — ED AVS SNAPSHOT
[facility]    [address] [facility] [address]    Phone:  [phone]    Fax:  [phone]                                       Ramila Kaye   MRN: [id number]    Department:  [facility]   Date of Visit:  3/13/2018           After Visit Summary Signature Page     I have received my discharge instructions, and my questions have been answered. I have discussed any challenges I see with this plan with the nurse or doctor.    ..........................................................................................................................................  Patient/Patient Representative Signature      ..........................................................................................................................................  Patient Representative Print Name and Relationship to Patient    ..................................................               ................................................  Date                                            Time    ..........................................................................................................................................  Reviewed by Signature/Title    ...................................................              ..............................................  Date                                                            Time

## 2018-03-14 NOTE — DISCHARGE INSTRUCTIONS
Mallet Finger  You have an injury to your finger causing the tip of your finger to droop down. This makes your finger look like a small hammer or mallet. This is why it s given this name. It is also called baseball finger. This injury happens when the tendon that holds the finger straight at the last joint tears. Sometimes a small break happens where this tendon attaches to the bone.  This causes local pain, swelling, and bruising. This injury usually takes about 4 to 6 weeks to heal. This injury is often treated with a special finger splint called a stack splint. It holds the tendon in the correct position. But even with right treatment, it may not be possible to fully straighten that joint after the injury heals. After healing, the joint will be stiff but usually recovers flexibility over time.  Home care    Keep your arm elevated to reduce pain and swelling. When sitting or lying down elevate your arm above the level of your heart. You can do this by placing your arm on a pillow that rests on your chest or on a pillow at your side. This is most important during the first 48 hours after the injury.    Put an ice pack over the injured area for 15 to 20 minutes every 3 to 6 hours. You should do this for the first 24 to 48 hours. You can make an ice pack by filling a plastic bag that seals at the top with ice cubes and then wrapping it with a thin towel. Be careful not to injure your skin with the ice treatments. Ice should never be applied directly to skin. Continue the use of ice packs for relief of pain and swelling as needed. After 48 hours, apply heat (warm shower or warm bath) for 15 to 20 minutes several times a day, or alternate ice and heat.    If a splint was applied, keep it in place for the time advised. If you remove it too soon, it will cause the position of the tendon to change and the finger joint will heal in a bent position.    You may use over-the-counter pain medicine to control pain, unless  another pain medicine was prescribed.Talk with your healthcare provider before using these medicines if you have chronic liver or kidney disease or ever had a stomach ulcer or GI bleeding.    Most patients can return to normal activity while wearing the splint. Discuss any limitations with your healthcare provider.  Follow-up care  Follow up with your healthcare provider, or as advised.  If X-rays were taken, you will be told of any new findings that may affect your care.   When to seek medical advice  Call your healthcare provider right away if any of the following occur:    Pain or swelling in the injured finger increases    The finger becomes red or warm     Injured finger becomes cold, blue, numb, or tingly  Date Last Reviewed: 11/23/2015 2000-2017 The [employer]. [address] [place] [address], [place] [address]. All rights reserved. This information is not intended as a substitute for professional medical care. Always follow your healthcare professional's instructions.

## 2018-03-16 ENCOUNTER — OFFICE VISIT (OUTPATIENT)
Dept: ORTHOPEDICS | Facility: CLINIC | Age: 23
End: 2018-03-16
Payer: COMMERCIAL

## 2018-03-16 VITALS
HEIGHT: 69 IN | SYSTOLIC BLOOD PRESSURE: 123 MMHG | DIASTOLIC BLOOD PRESSURE: 53 MMHG | HEART RATE: 87 BPM | BODY MASS INDEX: 33.77 KG/M2 | OXYGEN SATURATION: 99 % | WEIGHT: 228 LBS

## 2018-03-16 DIAGNOSIS — M20.012 MALLET FINGER OF LEFT HAND: Primary | ICD-10-CM

## 2018-03-16 PROCEDURE — 99213 OFFICE O/P EST LOW 20 MIN: CPT | Performed by: FAMILY MEDICINE

## 2018-03-16 ASSESSMENT — PAIN SCALES - GENERAL: PAINLEVEL: MODERATE PAIN (4)

## 2018-03-16 NOTE — PATIENT INSTRUCTIONS
Thanks for coming today.  Ortho/[facility] Medicine Clinic  [address] [place] [address]    To schedule future appointments in Ortho Clinic, you may call [phone].    To schedule ordered imaging by your provider:   Call Central Imaging [phone]    To schedule an injection ordered by your provider:  Call Central Imaging Injection scheduling line: [phone]  Egr [employer] available online at:  [email or web address]    Please call if any further questions or concerns ([phone]).  Clinic hours 8 am to 5 pm.    Return to clinic (call) if symptoms worsen or fail to improve.

## 2018-03-16 NOTE — LETTER
3/16/2018         RE: Ramila Kaye  [address] [place] [address]        Dear Colleague,    Thank you for referring your patient, Ramila Kaye, to the [facility]. Please see a copy of my visit note below.    CHIEF COMPLAINT:  No chief complaint on file.       HISTORY OF PRESENT ILLNESS  Ms. Kaye is a pleasant 22 year old year old female who presents to clinic today with a finger injury.  Ramila is seen at the request of Dr. Servin from the [facility] Emergency Department.    Three days ago Ramila was messing around at work with a couple of coworkers when her hand got caught in her fingers were hyperextended backwards.  She reports immediate numbness and pain in the fifth finger.  She was able to work for the rest of the day but noticed some bruising and continued numbness as the day went on.  This prompted her to go to the emergency department where she was diagnosed with a mallet finger.  She was given a stack splint and instructions to follow-up with orthopedics.    Additional history: as documented    MEDICAL HISTORY  Patient Active Problem List   Diagnosis     CARDIOVASCULAR SCREENING; LDL GOAL LESS THAN 160     Immunization not carried out - Declines HPV vaccine       Current Outpatient Prescriptions   Medication Sig Dispense Refill     HYDROcodone-acetaminophen (NORCO) 5-325 MG per tablet Take 1-2 tablets by mouth every 4 hours as needed for other (Moderate to Severe Pain) (Patient not taking: Reported on 11/7/2017) 30 tablet 0     ondansetron (ZOFRAN-ODT) 4 MG ODT tab Take 1-2 tablets (4-8 mg) by mouth every 8 hours as needed for nausea Dissolve ON the tongue. (Patient not taking: Reported on 11/7/2017) 20 tablet 0       Allergies   Allergen Reactions     Nka [No Known Allergies]        Family History   Problem Relation Age of Onset     Coronary Artery Disease Maternal Grandmother      Glaucoma Paternal Grandmother      GERD Brother        Additional  "medical/Social/Surgical histories reviewed in [facility] and updated as appropriate.     REVIEW OF SYSTEMS (3/16/2018)  CONSTITUTIONAL: Denies fever and weight loss  EYES: Denies acute vision changes  ENT: Denies hearing changes or difficulty swallowing  CARDIAC: Denies chest pain or edema  RESPIRATORY: Denies dyspnea, cough or wheeze  GASTROINTESTINAL: Denies abdominal pain, nausea, vomiting  MUSCULOSKELETAL: See HPI  SKIN: Denies any recent rash or lesion  NEUROLOGICAL: Denies numbness or focal weakness  PSYCHIATRIC: No history of psychiatric symptoms or problems  ENDOCRINE: Denies current diagnosis of diabetes  HEMATOLOGY: Denies episodes of easy bleeding      PHYSICAL EXAM  Vitals:    03/16/18 0754   BP: 123/53   Pulse: 87   SpO2: 99%   Weight: 103.4 kg (228 lb)   Height: 1.753 m (5' 9\")     General  - normal appearance, in no obvious distress  CV  - normal radial pulse  Pulm  - normal respiratory pattern, non-labored  Musculoskeletal - left 5th finger  - inspection: mild generalized swelling  Neuro  - no numbness, no motor deficit, grossly normal coordination, normal muscle tone  Skin  - palmar ecchymosis throughout finger  Psych  - interactive, appropriate, normal mood and affect           ASSESSMENT & PLAN  Ms. Kaye is a 22 year old year old female who presents to clinic today with a mallet finger.    I reviewed her x-rays in the room with her which showed no acute fracture.    We had a good discussion centering around management of a mallet finger.  She has been taking off her splint at times.    We discussed the importance of staying in her splint at all times and not flexing her finger whatsoever.  She does know that it is incredibly important to be in the splint continuously for the next 6 weeks to allow for scarring.  She also knows that if she does come out of the splin and flexes her finger that she will have to restart the 6 week time her.    We should follow-up in 6 weeks, at that point we will assess " her ability to extend.  If she is unable to extend we will continue her treatment for an additional 6 weeks.    Thank you for allowing me to participate in Ramila's care.    Bao Alan DO, [facility]  Primary Care Sports Medicine           Again, thank you for allowing me to participate in the care of your patient.        Sincerely,        Bao Alan DO

## 2018-03-16 NOTE — MR AVS SNAPSHOT
After Visit Summary   3/16/2018    Ramila Kaye    MRN: [id number]           Patient Information     Date Of Birth          1995        Visit Information        Provider Department      3/16/2018 8:00 AM Bao Alan DO [facility]        Today's Diagnoses     Mallet finger of left hand    -  1      Care Instructions    Thanks for coming today.  Ortho/Sports Medicine Clinic  [address] [place] [address]    To schedule future appointments in Ortho Clinic, you may call [phone].    To schedule ordered imaging by your provider:   Call Central Imaging [phone]    To schedule an injection ordered by your provider:  Call Central Imaging Injection scheduling line: [phone]  [employer] available online at:  [email or web address]    Please call if any further questions or concerns ([phone]).  Clinic hours 8 am to 5 pm.    Return to clinic (call) if symptoms worsen or fail to improve.            Follow-ups after your visit        Your next 10 appointments already scheduled     2018  8:00 AM CDT   Return Visit with Bao Alan DO   [facility] ([facility])    [address] [place] [address] [phone]              Who to contact     If you have questions or need follow up information about today's clinic visit or your schedule please contact [facility] directly at [phone].  Normal or non-critical lab and imaging results will be communicated to you by MyChart, letter or phone within 4 business days after the clinic has received the results. If you do not hear from us within 7 days, please contact the clinic through [employer] or phone. If you have a critical or abnormal lab result, we will notify you by phone as soon as possible.  Submit refill requests through [employer] or call your pharmacy and they will forward the refill request to us. Please allow 3  "business days for your refill to be completed.          Additional Information About Your Visit        [employer] Information     [employer] gives you secure access to your electronic health record. If you see a primary care provider, you can also send messages to your care team and make appointments. If you have questions, please call your primary care clinic.  If you do not have a primary care provider, please call [phone] and they will assist you.      [employer] is an electronic gateway that provides easy, online access to your medical records. With [employer], you can request a clinic appointment, read your test results, renew a prescription or communicate with your care team.     To access your existing account, please contact your [facility] or call [phone] for assistance.        Care EveryWhere ID     This is your Care EveryWhere ID. This could be used by other organizations to access your [facility] medical records  [id number]        Your Vitals Were     Pulse Height Pulse Oximetry BMI (Body Mass Index)          87 1.753 m (5' 9\") 99% 33.67 kg/m2         Blood Pressure from Last 3 Encounters:   03/16/18 123/53   03/13/18 135/76   11/07/17 127/72    Weight from Last 3 Encounters:   03/16/18 103.4 kg (228 lb)   03/13/18 102.1 kg (225 lb)   11/07/17 108.9 kg (240 lb)              Today, you had the following     No orders found for display       Primary Care Provider Office Phone # Fax #    Gail Hyatt PA-C [phone]       [address] [facility] [address]        Equal Access to Services     IVANNA EUBANKS AH: Hadii aad emiliana hadasho Solily, waaxda luqadaha, qaybta kaalmada shayy, bri etienne. So [facility] [phone].    ATENCIÓN: Si habla español, tiene a regalado disposición servicios gratuitos de asistencia lingüística. Llame al [phone].    We comply with applicable federal civil rights laws and [place] laws. We do not " discriminate on the basis of race, color, national origin, age, disability, sex, sexual orientation, or gender identity.            Thank you!     Thank you for choosing [facility]  for your care. Our goal is always to provide you with excellent care. Hearing back from our patients is one way we can continue to improve our services. Please take a few minutes to complete the written survey that you may receive in the mail after your visit with us. Thank you!             Your Updated Medication List - Protect others around you: Learn how to safely use, store and throw away your medicines at www.disposemymeds.org.      Notice  As of 3/16/2018  8:30 AM    You have not been prescribed any medications.

## 2018-03-16 NOTE — NURSING NOTE
"Ramila Kaye's goals for this visit include: Mallet finger   She requests these members of her care team be copied on today's visit information: yes    PCP: Gail Hyatt    Referring Provider:  Patti Laureano PA-C  [address] [place] [address]    Chief Complaint   Patient presents with     Consult     Mallet little finger, Left       Initial /53  Pulse 87  Ht 1.753 m (5' 9\")  Wt 103.4 kg (228 lb)  SpO2 99%  BMI 33.67 kg/m2 Estimated body mass index is 33.67 kg/(m^2) as calculated from the following:    Height as of this encounter: 1.753 m (5' 9\").    Weight as of this encounter: 103.4 kg (228 lb).  Medication Reconciliation: complete    "

## 2018-03-16 NOTE — PROGRESS NOTES
CHIEF COMPLAINT:  No chief complaint on file.       HISTORY OF PRESENT ILLNESS  Ms. Kaye is a pleasant 22 year old year old female who presents to clinic today with a finger injury.  Ramila is seen at the request of Dr. Servin from the [facility] Emergency Department.    Three days ago Ramila was messing around at work with a couple of coworkers when her hand got caught in her fingers were hyperextended backwards.  She reports immediate numbness and pain in the fifth finger.  She was able to work for the rest of the day but noticed some bruising and continued numbness as the day went on.  This prompted her to go to the emergency department where she was diagnosed with a mallet finger.  She was given a stack splint and instructions to follow-up with orthopedics.    Additional history: as documented    MEDICAL HISTORY  Patient Active Problem List   Diagnosis     CARDIOVASCULAR SCREENING; LDL GOAL LESS THAN 160     Immunization not carried out - Declines HPV vaccine       Current Outpatient Prescriptions   Medication Sig Dispense Refill     HYDROcodone-acetaminophen (NORCO) 5-325 MG per tablet Take 1-2 tablets by mouth every 4 hours as needed for other (Moderate to Severe Pain) (Patient not taking: Reported on 11/7/2017) 30 tablet 0     ondansetron (ZOFRAN-ODT) 4 MG ODT tab Take 1-2 tablets (4-8 mg) by mouth every 8 hours as needed for nausea Dissolve ON the tongue. (Patient not taking: Reported on 11/7/2017) 20 tablet 0       Allergies   Allergen Reactions     Nka [No Known Allergies]        Family History   Problem Relation Age of Onset     Coronary Artery Disease Maternal Grandmother      Glaucoma Paternal Grandmother      GERD Brother        Additional medical/Social/Surgical histories reviewed in [facility] and updated as appropriate.     REVIEW OF SYSTEMS (3/16/2018)  CONSTITUTIONAL: Denies fever and weight loss  EYES: Denies acute vision changes  ENT: Denies hearing changes or difficulty swallowing  CARDIAC:  "Denies chest pain or edema  RESPIRATORY: Denies dyspnea, cough or wheeze  GASTROINTESTINAL: Denies abdominal pain, nausea, vomiting  MUSCULOSKELETAL: See HPI  SKIN: Denies any recent rash or lesion  NEUROLOGICAL: Denies numbness or focal weakness  PSYCHIATRIC: No history of psychiatric symptoms or problems  ENDOCRINE: Denies current diagnosis of diabetes  HEMATOLOGY: Denies episodes of easy bleeding      PHYSICAL EXAM  Vitals:    03/16/18 0754   BP: 123/53   Pulse: 87   SpO2: 99%   Weight: 103.4 kg (228 lb)   Height: 1.753 m (5' 9\")     General  - normal appearance, in no obvious distress  CV  - normal radial pulse  Pulm  - normal respiratory pattern, non-labored  Musculoskeletal - left 5th finger  - inspection: mild generalized swelling  Neuro  - no numbness, no motor deficit, grossly normal coordination, normal muscle tone  Skin  - palmar ecchymosis throughout finger  Psych  - interactive, appropriate, normal mood and affect           ASSESSMENT & PLAN  Ms. Kaye is a 22 year old year old female who presents to clinic today with a mallet finger.    I reviewed her x-rays in the room with her which showed no acute fracture.    We had a good discussion centering around management of a mallet finger.  She has been taking off her splint at times.    We discussed the importance of staying in her splint at all times and not flexing her finger whatsoever.  She does know that it is incredibly important to be in the splint continuously for the next 6 weeks to allow for scarring.  She also knows that if she does come out of the splin and flexes her finger that she will have to restart the 6 week time her.    We should follow-up in 6 weeks, at that point we will assess her ability to extend.  If she is unable to extend we will continue her treatment for an additional 6 weeks.    Thank you for allowing me to participate in Ramila's care.    Bao Alan DO, CAM  Primary Care Sports Medicine         "

## 2018-03-27 ENCOUNTER — NURSE TRIAGE (OUTPATIENT)
Dept: NURSING | Facility: CLINIC | Age: 23
End: 2018-03-27

## 2018-03-27 ENCOUNTER — TELEPHONE (OUTPATIENT)
Dept: FAMILY MEDICINE | Facility: CLINIC | Age: 23
End: 2018-03-27

## 2018-03-27 NOTE — TELEPHONE ENCOUNTER
Ramila called about her healing process with mallet finger injury.  She has been following Dr Alan's instructions and keeping her splint on 24/7.  She's concerned because at times she gets severe pain up into her arm. She wants to know if this is something that can be expected or is it reason to be seen again?  Please call Ramila, [phone].  Routed: P [id number] Dr Waqas TORO RN [facility]

## 2018-03-27 NOTE — TELEPHONE ENCOUNTER
Ramila called about her healing process with mallet finger injury.  She has been following Dr Alan's instructions and keeping her splint on 24/7.  She's concerned because at times she gets severe pain up into her arm. She wants to know if this is something that can be expected or is it reason to be seen again?  Please call Ramila, [phone].  Routed: P [id number] Dr Waqas TORO RN [facility]

## 2018-03-29 ENCOUNTER — OFFICE VISIT (OUTPATIENT)
Dept: ORTHOPEDICS | Facility: CLINIC | Age: 23
End: 2018-03-29
Payer: COMMERCIAL

## 2018-03-29 VITALS — SYSTOLIC BLOOD PRESSURE: 114 MMHG | DIASTOLIC BLOOD PRESSURE: 77 MMHG | OXYGEN SATURATION: 98 % | HEART RATE: 87 BPM

## 2018-03-29 DIAGNOSIS — M20.012 MALLET FINGER OF LEFT HAND: Primary | ICD-10-CM

## 2018-03-29 PROCEDURE — 99213 OFFICE O/P EST LOW 20 MIN: CPT | Performed by: FAMILY MEDICINE

## 2018-03-29 ASSESSMENT — PAIN SCALES - GENERAL: PAINLEVEL: MILD PAIN (2)

## 2018-03-29 NOTE — NURSING NOTE
"Ramila Kaye's goals for this visit include: follow   She requests these members of her care team be copied on today's visit information: Mallet finger of left hand follow up    PCP: Gail Hyatt    [place]  [address] [place] [address]    Chief Complaint   Patient presents with     RECHECK     Mallet finger of left hand follow up. pt states swelling and brusing. pt states she is still getting pain.        Initial /77  Pulse 87  SpO2 98% Estimated body mass index is 33.67 kg/(m^2) as calculated from the following:    Height as of 3/16/18: 1.753 m (5' 9\").    Weight as of 3/16/18: 103.4 kg (228 lb).  Medication Reconciliation: complete    "

## 2018-03-29 NOTE — MR AVS SNAPSHOT
After Visit Summary   3/29/2018    Ramila Kaye    MRN: [id number]           Patient Information     Date Of Birth          1995        Visit Information        Provider Department      3/29/2018 9:40 AM Bao Alan DO [facility]        Today's Diagnoses     Mallet finger of left hand    -  1      Care Instructions    Thanks for coming today.  Ortho/Sports Medicine Clinic  [address] [place] [address]    To schedule future appointments in Ortho Clinic, you may call [phone].    To schedule ordered imaging by your provider:   Call Central Imaging [phone]    To schedule an injection ordered by your provider:  Call Central Imaging Injection scheduling line: [phone]  [email or web address] available online at:  [email or web address]    Please call if any further questions or concerns ([phone]).  Clinic hours 8 am to 5 pm.    Return to clinic (call) if symptoms worsen or fail to improve.            Follow-ups after your visit        Your next 10 appointments already scheduled     May 03, 2018  4:40 PM CDT   Return Visit with Bao Alan DO   [facility] ([facility]    [address] [place] [id number] [phone]              Who to contact     If you have questions or need follow up information about today's clinic visit or your schedule please contact [facility] directly at [phone].  Normal or non-critical lab and imaging results will be communicated to you by MyChart, letter or phone within 4 business days after the clinic has received the results. If you do not hear from us within 7 days, please contact the clinic through Ballparchart or phone. If you have a critical or abnormal lab result, we will notify you by phone as soon as possible.  Submit refill requests through [employer] or call your pharmacy and they will forward the refill request to us. Please allow 3  business days for your refill to be completed.          Additional Information About Your Visit        Dibsiehart Information     Xooker gives you secure access to your electronic health record. If you see a primary care provider, you can also send messages to your care team and make appointments. If you have questions, please call your primary care clinic.  If you do not have a primary care provider, please call [phone] and they will assist you.      Xooker is an electronic gateway that provides easy, online access to your medical records. With Xooker, you can request a clinic appointment, read your test results, renew a prescription or communicate with your care team.     To access your existing account, please contact your [facility] or call [phone] for assistance.        Care EveryWhere ID     This is your Care EveryWhere ID. This could be used by other organizations to access your [facility] medical records  [id number]        Your Vitals Were     Pulse Pulse Oximetry                87 98%           Blood Pressure from Last 3 Encounters:   03/29/18 114/77   03/16/18 123/53   03/13/18 135/76    Weight from Last 3 Encounters:   03/16/18 103.4 kg (228 lb)   03/13/18 102.1 kg (225 lb)   11/07/17 108.9 kg (240 lb)              Today, you had the following     No orders found for display       Primary Care Provider Office Phone # Fax #    Gail Hyatt PA-C [phone] [address] [facility] [address]        Equal Access to Services     IVANNA EUBANKS AH: Hadii aad ku orino Socatalinaali, waaxda luqadaha, qaybta kaalmada adeegyada, bri etienne. So [facility] [phone].    ATENCIÓN: Si habla español, tiene a regalado disposición servicios gratuitos de asistencia lingüística. Llame al [phone].    We comply with applicable federal civil rights laws and [place] laws. We do not discriminate on the basis of race, color, national  origin, age, disability, sex, sexual orientation, or gender identity.            Thank you!     Thank you for choosing [facility]  for your care. Our goal is always to provide you with excellent care. Hearing back from our patients is one way we can continue to improve our services. Please take a few minutes to complete the written survey that you may receive in the mail after your visit with us. Thank you!             Your Updated Medication List - Protect others around you: Learn how to safely use, store and throw away your medicines at www.disposemymeds.org.      Notice  As of 3/29/2018 10:07 AM    You have not been prescribed any medications.

## 2018-03-29 NOTE — PATIENT INSTRUCTIONS
Thanks for coming today.  Ortho/Sports Medicine Clinic  [address] [place] [address]    To schedule future appointments in Ortho Clinic, you may call [phone].    To schedule ordered imaging by your provider:   Call Central Imaging [phone]    To schedule an injection ordered by your provider:  Call Central Imaging Injection scheduling line: [phone]  [email or web address] available online at:  [email or web address]    Please call if any further questions or concerns ([phone]).  Clinic hours 8 am to 5 pm.    Return to clinic (call) if symptoms worsen or fail to improve.

## 2018-03-29 NOTE — PROGRESS NOTES
HISTORY OF PRESENT ILLNESS  Ms. Kaye is a pleasant 22 year old year old female following up with a mallet finger.  Ramila noticed bruising in her dorsal hand over the past week.  This has not gotten any worse but is persistent.  She also has had pain traveling up from her pinky to the ulnar side of her wrist and forearm.  No new event that she can recall.  She has been in the splint constantly.  She has been using her hands at work, this is unavoidable.  Additional history: as documented      REVIEW OF SYSTEMS (3/29/2018)  10 point ROS of systems including Constitutional, Eyes, Respiratory, Cardiovascular, Gastroenterology, Genitourinary, Integumentary, Musculoskeletal, Psychiatric were all negative except for pertinent positives noted in my HPI.     PHYSICAL EXAM  Vitals:    03/29/18 0948   BP: 114/77   Pulse: 87   SpO2: 98%     General  - normal appearance, in no obvious distress  CV  - normal radial pulse  Pulm  - normal respiratory pattern, non-labored  Musculoskeletal - left hand, fifth finger  - inspection: no swelling  Neuro  - no numbness, no motor deficit, grossly normal coordination, normal muscle tone  Skin  - trace ecchymosis over left 5th finger at MCP, left ulnar sided hand  Psych  - interactive, appropriate, normal mood and affect          ASSESSMENT & PLAN  Ms. Kaye is a 22 year old year old female following up with a mallet finger.    Her exam is reassuring today, so is the fact that she has not been out of her splint.    I do think that this is part of the normal healing process, we had a good discussion about this.  I did replace her splint while taking care not to bend her DIP.    Ramila will stick with the initial plan of following up in 6 weeks time total, 4 weeks from now.    It was a pleasure seeing Ramila.        Bao Alan DO, CAQSM

## 2018-03-29 NOTE — LETTER
3/29/2018         RE: Ramila Kaye  [address] [place] [id number]        Dear Colleague,    Thank you for referring your patient, Ramila Kaye, to the [facility]. Please see a copy of my visit note below.    HISTORY OF PRESENT ILLNESS  Ms. Kaye is a pleasant 22 year old year old female following up with a mallet finger.  Ramila noticed bruising in her dorsal hand over the past week.  This has not gotten any worse but is persistent.  She also has had pain traveling up from her pinky to the ulnar side of her wrist and forearm.  No new event that she can recall.  She has been in the splint constantly.  She has been using her hands at work, this is unavoidable.  Additional history: as documented      REVIEW OF SYSTEMS (3/29/2018)  10 point ROS of systems including Constitutional, Eyes, Respiratory, Cardiovascular, Gastroenterology, Genitourinary, Integumentary, Musculoskeletal, Psychiatric were all negative except for pertinent positives noted in my HPI.     PHYSICAL EXAM  Vitals:    03/29/18 0948   BP: 114/77   Pulse: 87   SpO2: 98%     General  - normal appearance, in no obvious distress  CV  - normal radial pulse  Pulm  - normal respiratory pattern, non-labored  Musculoskeletal - left hand, fifth finger  - inspection: no swelling  Neuro  - no numbness, no motor deficit, grossly normal coordination, normal muscle tone  Skin  - trace ecchymosis over left 5th finger at MCP, left ulnar sided hand  Psych  - interactive, appropriate, normal mood and affect          ASSESSMENT & PLAN  Ms. Kaye is a 22 year old year old female following up with a mallet finger.    Her exam is reassuring today, so is the fact that she has not been out of her splint.    I do think that this is part of the normal healing process, we had a good discussion about this.  I did replace her splint while taking care not to bend her DIP.    Ramila will stick with the initial plan of following up in 6 weeks time  total, 4 weeks from now.    It was a pleasure seeing Ramila.        Bao Alan DO, [facility]            Again, thank you for allowing me to participate in the care of your patient.        Sincerely,        Bao Alan DO

## 2018-04-16 ENCOUNTER — RADIANT APPOINTMENT (OUTPATIENT)
Dept: GENERAL RADIOLOGY | Facility: CLINIC | Age: 23
End: 2018-04-16
Attending: PHYSICIAN ASSISTANT
Payer: COMMERCIAL

## 2018-04-16 ENCOUNTER — OFFICE VISIT (OUTPATIENT)
Dept: FAMILY MEDICINE | Facility: CLINIC | Age: 23
End: 2018-04-16
Payer: COMMERCIAL

## 2018-04-16 VITALS
SYSTOLIC BLOOD PRESSURE: 122 MMHG | TEMPERATURE: 100 F | BODY MASS INDEX: 36.73 KG/M2 | HEART RATE: 108 BPM | HEIGHT: 69 IN | DIASTOLIC BLOOD PRESSURE: 72 MMHG | WEIGHT: 248 LBS

## 2018-04-16 DIAGNOSIS — M75.41 IMPINGEMENT SYNDROME, SHOULDER, RIGHT: Primary | ICD-10-CM

## 2018-04-16 DIAGNOSIS — M25.511 ACUTE PAIN OF RIGHT SHOULDER: ICD-10-CM

## 2018-04-16 PROCEDURE — 99213 OFFICE O/P EST LOW 20 MIN: CPT | Performed by: PHYSICIAN ASSISTANT

## 2018-04-16 PROCEDURE — 73030 X-RAY EXAM OF SHOULDER: CPT | Mod: RT

## 2018-04-16 NOTE — PROGRESS NOTES
"  SUBJECTIVE:   Ramila Kaye is a 22 year old female who presents to clinic today for the following health issues:      Joint Pain    Onset: This morning     Description:   Location: right shoulder  Character: Dull ache and Cramping with movement     Intensity: moderate    Progression of Symptoms: same    Accompanying Signs & Symptoms:  Other symptoms: numbness    History:   Previous similar pain: YES- she has had clicking before       Precipitating factors:   Trauma or overuse: YES- cleaning off cars and it started to bother her    Alleviating factors:  Improved by: nothing    Therapies Tried and outcome: None    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  Additional history: as documented    No current outpatient prescriptions on file.     Allergies   Allergen Reactions     Nka [No Known Allergies]      BP Readings from Last 3 Encounters:   04/16/18 122/72   03/29/18 114/77   03/16/18 123/53    Wt Readings from Last 3 Encounters:   04/16/18 248 lb (112.5 kg)   03/16/18 228 lb (103.4 kg)   03/13/18 225 lb (102.1 kg)                    Reviewed and updated as needed this visit by clinical staff       Reviewed and updated as needed this visit by Provider         ROS:  Remainder of ROS obtained and found to be negative other than that which was documented above      OBJECTIVE:     /72  Pulse 108  Temp 100  F (37.8  C) (Tympanic)  Ht 5' 9\" (1.753 m)  Wt 248 lb (112.5 kg)  BMI 36.62 kg/m2  Body mass index is 36.62 kg/(m^2).  GENERAL: " healthy, alert and no distress  Shoulder exam: Right  Range of Motion:  Forward flexion: full with pain above shoulder height                               Abduction: Full with pain above the shoulder height                                Internal rotation: full                                External rotation: full      Inspection:  No visible deformity noted.     Palpation:  Sternoclavicular joint nontender          Acromioclavicular joint tender   Subacromial space tender   Posterior shoulder and periscapular region nontender     Strength:  Abduction (arm at side) 5/5 with some mild pain   Internal rotation (arm at side) 5/5   External rotation (arm at side) 5/5       Impingement tests:  Neers (forward flexion) painful    Empty can (thumb down in scaption position) painful         Diagnostic Test Results:  Xray, rights shoulder: no acute abnormality on independent review. Joint space preserved    ASSESSMENT/PLAN:         ICD-10-CM    1. Impingement syndrome, shoulder, right M75.41 [facility]   2. Acute pain of right shoulder M25.511 XR Shoulder Right 2 Views     [facility]       Patient Instructions   Try ibuprofen 600mg three times daily with meals for the next 4-5 days    Ice     Avoid aggravating activities    Start PT - this is not only for current pain but to hopefully prevent this from happening in the future    If no improvement or any worsening, please let me know and we can have you see one of our sports orthopedic providers      Shoulder Impingement Syndrome  The rotator cuff is a group of muscles and tendons that surround the shoulder joint. These muscles and tendons hold the arm in its joint. They help the shoulder move. The rotator cuff muscles and tendons can become irritated from repeated rubbing against the shoulder bone. This is called shoulder impingement syndrome or rotator cuff tendonitis.     If your case is mild, you may only need to  rest the shoulder and then do certain exercises to strengthen the muscles. You can also take anti-inflammatory medicines. Steroid injections into the shoulder can ease inflammation. But you can have only a limited number of these. If the condition gets worse, your shoulder muscles may become thin and weak. This can lead to a rotator cuff tear.  Symptoms of shoulder impingement syndrome may include:    Shoulder pain that gets worse when you raise your arm overhead    Weakness of the shoulder muscles when you use your arm overhead    Popping and clicking when you move your shoulder    Shoulder pain that wakes you up at night, especially when you sleep on the affected shoulder    Sudden pain in your shoulder when you lift or reach  Home care  Follow these tips to take care of yourself at home:    Avoid activities that make your pain worse. These include raising your arms overhead, repeating the same motion over and over, or lifting heavy objects.    Don t hold your arm in one position for a long time. Keep it moving.    Put an ice pack on the sore area for 20 minutes every 1 to 2 hours for the first day. You can make an ice pack by putting ice cubes in a plastic bag. Wrap the bag in a towel before putting it on your shoulder. A frozen bag of peas or something similar can also be used as an ice pack. Use the ice packs 3 to 4 times a day for the next 2 days. Continue using the ice to relieve of pain and swelling as needed.    You may take acetaminophen or ibuprofen to control pain, unless another medicine was prescribed. If prednisone was prescribed, don t take anti-inflammatory medicines. If you have chronic liver or kidney disease or ever had a stomach ulcer or gastrointestinal bleeding, talk with your doctor before using these medicines.    After your symptoms ease, you may get physical therapy or start a home exercise program. This can strengthen your shoulder muscles and help your range of motion. Talk with your  doctor about what is best for your condition.  Follow-up care  Follow up with your healthcare provider, or as advised.  When to seek medical advice  Call your healthcare provider right away if any of these occur:    Shoulder pain that gets worse and wakes you up at night    Your shoulder or arm swells    Numbness, tingling, or pain that travels down the arm to the hand    Loss of shoulder strength    Fever or chills  Date Last Reviewed: 8/1/2016 2000-2017 [employer]. [address] [place], [place] [id number]. All rights reserved. This information is not intended as a substitute for professional medical care. Always follow your healthcare professional's instructions.                      Angela Wheeler PA-C  [facility]

## 2018-04-16 NOTE — NURSING NOTE
"Chief Complaint   Patient presents with     Arm Pain       Initial /72  Pulse 108  Temp 100  F (37.8  C) (Tympanic)  Ht 5' 9\" (1.753 m)  Wt 248 lb (112.5 kg)  BMI 36.62 kg/m2 Estimated body mass index is 36.62 kg/(m^2) as calculated from the following:    Height as of this encounter: 5' 9\" (1.753 m).    Weight as of this encounter: 248 lb (112.5 kg).  Medication Reconciliation: complete     Raymundo Harp CMA    "

## 2018-04-16 NOTE — PATIENT INSTRUCTIONS
Try ibuprofen 600mg three times daily with meals for the next 4-5 days    Ice     Avoid aggravating activities    Start PT - this is not only for current pain but to hopefully prevent this from happening in the future    If no improvement or any worsening, please let me know and we can have you see one of our sports orthopedic providers      Shoulder Impingement Syndrome  The rotator cuff is a group of muscles and tendons that surround the shoulder joint. These muscles and tendons hold the arm in its joint. They help the shoulder move. The rotator cuff muscles and tendons can become irritated from repeated rubbing against the shoulder bone. This is called shoulder impingement syndrome or rotator cuff tendonitis.     If your case is mild, you may only need to rest the shoulder and then do certain exercises to strengthen the muscles. You can also take anti-inflammatory medicines. Steroid injections into the shoulder can ease inflammation. But you can have only a limited number of these. If the condition gets worse, your shoulder muscles may become thin and weak. This can lead to a rotator cuff tear.  Symptoms of shoulder impingement syndrome may include:    Shoulder pain that gets worse when you raise your arm overhead    Weakness of the shoulder muscles when you use your arm overhead    Popping and clicking when you move your shoulder    Shoulder pain that wakes you up at night, especially when you sleep on the affected shoulder    Sudden pain in your shoulder when you lift or reach  Home care  Follow these tips to take care of yourself at home:    Avoid activities that make your pain worse. These include raising your arms overhead, repeating the same motion over and over, or lifting heavy objects.    Don t hold your arm in one position for a long time. Keep it moving.    Put an ice pack on the sore area for 20 minutes every 1 to 2 hours for the first day. You can make an ice pack by putting ice cubes in a plastic  bag. Wrap the bag in a towel before putting it on your shoulder. A frozen bag of peas or something similar can also be used as an ice pack. Use the ice packs 3 to 4 times a day for the next 2 days. Continue using the ice to relieve of pain and swelling as needed.    You may take acetaminophen or ibuprofen to control pain, unless another medicine was prescribed. If prednisone was prescribed, don t take anti-inflammatory medicines. If you have chronic liver or kidney disease or ever had a stomach ulcer or gastrointestinal bleeding, talk with your doctor before using these medicines.    After your symptoms ease, you may get physical therapy or start a home exercise program. This can strengthen your shoulder muscles and help your range of motion. Talk with your doctor about what is best for your condition.  Follow-up care  Follow up with your healthcare provider, or as advised.  When to seek medical advice  Call your healthcare provider right away if any of these occur:    Shoulder pain that gets worse and wakes you up at night    Your shoulder or arm swells    Numbness, tingling, or pain that travels down the arm to the hand    Loss of shoulder strength    Fever or chills  Date Last Reviewed: 8/1/2016 2000-2017 [employer]. [address] [place] [address], [place] [address]. All rights reserved. This information is not intended as a substitute for professional medical care. Always follow your healthcare professional's instructions.

## 2018-04-23 ENCOUNTER — HOSPITAL ENCOUNTER (OUTPATIENT)
Dept: PHYSICAL THERAPY | Facility: CLINIC | Age: 23
Setting detail: THERAPIES SERIES
End: 2018-04-23
Attending: PHYSICIAN ASSISTANT
Payer: COMMERCIAL

## 2018-04-23 PROCEDURE — 40000718 ZZHC STATISTIC PT DEPARTMENT ORTHO VISIT: Performed by: PHYSICAL THERAPIST

## 2018-04-23 PROCEDURE — 97110 THERAPEUTIC EXERCISES: CPT | Mod: GP | Performed by: PHYSICAL THERAPIST

## 2018-04-23 PROCEDURE — 97035 APP MDLTY 1+ULTRASOUND EA 15: CPT | Mod: GP | Performed by: PHYSICAL THERAPIST

## 2018-04-23 PROCEDURE — 97162 PT EVAL MOD COMPLEX 30 MIN: CPT | Mod: GP | Performed by: PHYSICAL THERAPIST

## 2018-04-23 NOTE — PROGRESS NOTES
04/23/18 1500   General Information   Type of Visit Initial OP Ortho PT Evaluation   Start of Care Date 04/23/18   Referring Physician Angela Wheeler    Patient/Family Goals Statement Sleep on R side w/o P, Reaching up onto shelf more comfortably. Washing own hair, overhead work. Be able to lift weights again.    Orders Evaluate and Treat   Date of Order 04/16/18   Insurance Type Other   Insurance Comments/Visits Authorized Preferred one - Auth'd   Medical Diagnosis Impingement syndrome   Surgical/Medical history reviewed (Gallbladder October 2017)   Precautions/Limitations (Avoid heavy lifting, overhead stuff. )   General Information Comments Taking ibuprofen w/ meals. Icing it during the day.    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 Had been bugging for a couple years.    Impairments A. Pain;F. Decreased strength and endurance   Functional Limitations perform required work activities   Symptom Location Shooting P's down Ant R arm, R Ant shoulder darwin. Less strength w/ pushing upward. Sometimes w/ shifting a car.    How/Where did it occur From insidious onset;With repetition/overuse;At home;At work   Onset date of current episode/exacerbation 04/16/18  (Order date. )   Chronicity New   Pain rating (0-10 point scale) (2-8/10)   Pain quality A. Sharp;B. Dull;C. Aching;E. Shooting;G. Cramping   Frequency of pain/symptoms C. With activity   Pain/symptoms are: The same all the time  (If sleep wrong, mornings, or sometimes at night so ice it. )   Pain/symptoms exacerbated by C. Lifting;D. Carrying;G. Certain positions;H. Overhead reach;L. Work tasks   Pain/symptoms eased by H. Cold   Progression of symptoms since onset: Unchanged   Current / Previous Interventions   Diagnostic Tests: X-ray   X-ray Results  unremarkable   Prior Level of Function   Functional Level Prior Comment Independent w/ ADL's.    Current Level of Function   Current Community Support Family/friend caregiver   Patient role/employment history A. 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    Fall Risk Screen   Fall screen completed by PT   Have you fallen 2 or more times in the past year? No   Have you fallen and had an injury in the past year? No   Timed Up and Go score (seconds) Walks quickly into dept.    Is patient a fall risk? No   System Outcome Measures   Outcome Measures (SPADI  40.77)   Functional Scales   Functional Scales OPTIMAL   Optimal (1=able to do without difficulty, 2=able to do with little difficulty, 3=able to do with moderate difficulty, 4=able to do with much difficulty, 5=unable to do, 9=NA) Activity 1;Activity 2;Activity 3   Activity 1 comment Reaching overhead 4/10 on functional scale.    Activity 2 comment WAshing hair or back 4/10 on functional scale.    Activity 3 comment Sleeping on R side 8/10 Pain.   Shoulder Objective Findings   Observation No acute distress.    Integumentary  Normal    Posture Head forward, Dowager's, Flat thoracic back,.   Cervical Screen (ROM, quadrant) Normal    Shoulder ROM Comment Flex R 123, L 143; Abd R 137, L 169; ER R 53, L 77; IR R T6, L T5. Popping in R shoulder w/ IR. Pful arc on R.    Scapulothoracic Rhythm Slight winging w/ Flex and Abd.    Neer's Test + w/ Flex and Abd    Hutchinson-Fabio Test +    Coracoid Test Negative    Bursa Test Negative   Adah's Test R +    Sulcus Test Popped    Crossover Test Negative    Shoulder Special Tests Comments MMT: R Abd at 0 and 90 4 w/ P, E.Can 4 w/ P, Elbow Flex 5-. Bicipital Tendonitis Negative.    Palpation R Lev, UTrap, Scalene, R Suprasp mm,    Planned Therapy Interventions   Planned Therapy Interventions Comment Develop HEP for ROM, Restrengthening, MT, STM, Scap Mobs.    Planned Modality Interventions    Planned Modality Interventions Comments US, Kinesiotape?    Clinical Impression   Criteria for Skilled Therapeutic Interventions Met yes, treatment indicated   PT Diagnosis Impingement, Biceps Strain, Scapular dysfunction.    Influenced by the following impairments Pain, Decreased ROM    Functional limitations due to impairments Reaching, Pushing for job duties, Sleeping.    Clinical Presentation Evolving/Changing   Clinical Presentation Rationale SPADI, ROM, MMT, Special tests, Palpation, Posture.    Clinical Decision Making (Complexity) Moderate complexity   Therapy Frequency 2 times/Week   Predicted Duration of Therapy Intervention (days/wks) 1 month, decreasing frequency as appropriate. 10 visits.    Risk & Benefits of therapy have been explained Yes   Patient, Family & other staff in agreement with plan of care Yes   Clinical Impression Comments Impingement, Biceps Strain, Scapular dysfunction.    Education Assessment   Preferred Learning Style Listening;Demonstration;Pictures/video   Barriers to Learning No barriers   ORTHO GOALS   PT Ortho Eval Goals 1;2;3;4   Ortho Goal 1   Goal Identifier 1   Goal Description STG: Pt will be able to sleep on her R side w/ P less than 3/10    Target Date 05/25/18   Ortho Goal 2   Goal Identifier 2   Goal Description STG: Pt will be able to wash her hair and her back 1/10 on Functional scale.   Target Date 05/25/18   Ortho Goal 3   Goal Identifier 3   Goal Description STG: Pt will be able to reach to a high shelf 1/10 on Functional scale.    Target Date 05/25/18   Ortho Goal 4   Goal Identifier 4   Goal Description LTG: Pt will be independen w/ HEP to re-strengthen R shoulder and improve ROM.    Target Date 06/15/18   Total Evaluation Time   Total Evaluation Time 50 Total (Eval x 25, Ex x 15, US x 10)    Janis Shultz, PT, [facility] (#[id number])  [facility]           [phone]  Fax          [phone]  Appt #      [phone]

## 2018-04-25 ENCOUNTER — HOSPITAL ENCOUNTER (OUTPATIENT)
Dept: PHYSICAL THERAPY | Facility: CLINIC | Age: 23
Setting detail: THERAPIES SERIES
End: 2018-04-25
Attending: PHYSICIAN ASSISTANT
Payer: COMMERCIAL

## 2018-04-25 PROCEDURE — 97110 THERAPEUTIC EXERCISES: CPT | Mod: GP | Performed by: PHYSICAL THERAPIST

## 2018-04-25 PROCEDURE — 97035 APP MDLTY 1+ULTRASOUND EA 15: CPT | Mod: GP | Performed by: PHYSICAL THERAPIST

## 2018-04-25 PROCEDURE — 40000718 ZZHC STATISTIC PT DEPARTMENT ORTHO VISIT: Performed by: PHYSICAL THERAPIST

## 2018-04-30 ENCOUNTER — HOSPITAL ENCOUNTER (OUTPATIENT)
Dept: PHYSICAL THERAPY | Facility: CLINIC | Age: 23
Setting detail: THERAPIES SERIES
End: 2018-04-30
Attending: PHYSICIAN ASSISTANT
Payer: COMMERCIAL

## 2018-04-30 PROCEDURE — 40000718 ZZHC STATISTIC PT DEPARTMENT ORTHO VISIT: Performed by: PHYSICAL THERAPIST

## 2018-04-30 PROCEDURE — 97035 APP MDLTY 1+ULTRASOUND EA 15: CPT | Mod: GP | Performed by: PHYSICAL THERAPIST

## 2018-04-30 PROCEDURE — 97110 THERAPEUTIC EXERCISES: CPT | Mod: GP | Performed by: PHYSICAL THERAPIST

## 2018-05-02 ENCOUNTER — HOSPITAL ENCOUNTER (OUTPATIENT)
Dept: PHYSICAL THERAPY | Facility: CLINIC | Age: 23
Setting detail: THERAPIES SERIES
End: 2018-05-02
Attending: PHYSICIAN ASSISTANT
Payer: COMMERCIAL

## 2018-05-02 PROCEDURE — 40000718 ZZHC STATISTIC PT DEPARTMENT ORTHO VISIT: Performed by: PHYSICAL THERAPIST

## 2018-05-02 PROCEDURE — 97140 MANUAL THERAPY 1/> REGIONS: CPT | Mod: GP | Performed by: PHYSICAL THERAPIST

## 2018-05-02 PROCEDURE — 97035 APP MDLTY 1+ULTRASOUND EA 15: CPT | Mod: GP | Performed by: PHYSICAL THERAPIST

## 2018-05-03 ENCOUNTER — OFFICE VISIT (OUTPATIENT)
Dept: ORTHOPEDICS | Facility: CLINIC | Age: 23
End: 2018-05-03
Payer: COMMERCIAL

## 2018-05-03 VITALS — DIASTOLIC BLOOD PRESSURE: 85 MMHG | SYSTOLIC BLOOD PRESSURE: 126 MMHG | HEART RATE: 100 BPM

## 2018-05-03 DIAGNOSIS — M20.012 MALLET FINGER OF LEFT FINGER(S): Primary | ICD-10-CM

## 2018-05-03 PROCEDURE — 99213 OFFICE O/P EST LOW 20 MIN: CPT | Performed by: FAMILY MEDICINE

## 2018-05-03 NOTE — LETTER
5/3/2018         RE: Ramila Kaye  [address] [place] [id number]        Dear Colleague,    Thank you for referring your patient, Ramila Kaye, to the [facility]. Please see a copy of my visit note below.    HISTORY OF PRESENT ILLNESS  Ms. Kaye is a pleasant 22 year old year old female following up with a mallet finger.  I last saw Ramila on March 29, I initially saw her on March 16.  At her first visit she was put into a stack splint with instructions not to remove the splint for 6 weeks. Ramila has been very compliant.  She has caught the splint a couple of times on objects, although the splint has not come off.  Additional history: as documented      REVIEW OF SYSTEMS (5/3/2018)  10 point ROS of systems including Constitutional, Eyes, Respiratory, Cardiovascular, Gastroenterology, Genitourinary, Integumentary, Musculoskeletal, Psychiatric were all negative except for pertinent positives noted in my HPI.     PHYSICAL EXAM  Vitals:    05/03/18 1644   BP: 126/85   Pulse: 100     Stack splint was removed, extensor tendon and flexor tendon of the left fifth finger are intact.  Range of motion is stiff especially in flexion.,  Skin is normal.        ASSESSMENT & PLAN  Ms. Kaye is a 22 year old year old female following up with a mallet finger of the fifth finger of her left hand.  She is 7 weeks status post splinting.    I did buddy tape her fourth and fifth fingers today.  She should keep the buddy tape on for most of the day throughout the next week.  She can continue to use the hand in flexion and extension as tolerated.  I did demonstrate some light strengthening exercises for her to do.    If Ramila continues to do well she can follow-up as needed.    It was a pleasure seeing Ramila.        Bao Alan DO, CAQSM            Again, thank you for allowing me to participate in the care of your patient.        Sincerely,        Bao Alan DO

## 2018-05-03 NOTE — MR AVS SNAPSHOT
After Visit Summary   5/3/2018    Ramila Kaye    MRN: [id number]           Patient Information     Date Of Birth          1995        Visit Information        Provider Department      5/3/2018 4:40 PM Bao Alan, DO [facility]        Today's Diagnoses     Mallet finger of left finger(s)    -  1       Follow-ups after your visit        Who to contact     If you have questions or need follow up information about today's clinic visit or your schedule please contact [facility] directly at [phone].  Normal or non-critical lab and imaging results will be communicated to you by [employer], letter or phone within 4 business days after the clinic has received the results. If you do not hear from us within 7 days, please contact the clinic through [employer] or phone. If you have a critical or abnormal lab result, we will notify you by phone as soon as possible.  Submit refill requests through [email or web address] or call your pharmacy and they will forward the refill request to us. Please allow 3 business days for your refill to be completed.          Additional Information About Your Visit        MyChart Information     [email or web address] gives you secure access to your electronic health record. If you see a primary care provider, you can also send messages to your care team and make appointments. If you have questions, please call your primary care clinic.  If you do not have a primary care provider, please call [phone] and they will assist you.      [email or web address] is an electronic gateway that provides easy, online access to your medical records. With [email or web address], you can request a clinic appointment, read your test results, renew a prescription or communicate with your care team.     To access your existing account, please contact your [facility] or call [phone] for assistance.        Care EveryWhere ID     This is your Care EveryWhere ID. This could be  used by other organizations to access your [place] medical records  [id number]        Your Vitals Were     Pulse                   100            Blood Pressure from Last 3 Encounters:   05/03/18 126/85   04/16/18 122/72   03/29/18 114/77    Weight from Last 3 Encounters:   04/16/18 112.5 kg (248 lb)   03/16/18 103.4 kg (228 lb)   03/13/18 102.1 kg (225 lb)              Today, you had the following     No orders found for display       Primary Care Provider Office Phone # Fax #    Gail Hyatt PA-C [phone] [address] [facility] [address]        Equal Access to Services     IVANNA EUBANKS : John Escalante, wajaquelin renee, qacarolina kaalmada shayy, bri etienne. So [facility] [phone].    ATENCIÓN: Si habla español, tiene a regalado disposición servicios gratuitos de asistencia lingüística. Llame al [phone].    We comply with applicable federal civil rights laws and [place] laws. We do not discriminate on the basis of race, color, national origin, age, disability, sex, sexual orientation, or gender identity.            Thank you!     Thank you for choosing [facility]  for your care. Our goal is always to provide you with excellent care. Hearing back from our patients is one way we can continue to improve our services. Please take a few minutes to complete the written survey that you may receive in the mail after your visit with us. Thank you!             Your Updated Medication List - Protect others around you: Learn how to safely use, store and throw away your medicines at www.disposemymeds.org.      Notice  As of 5/3/2018  5:04 PM    You have not been prescribed any medications.

## 2018-05-03 NOTE — PROGRESS NOTES
HISTORY OF PRESENT ILLNESS  Ms. Kaye is a pleasant 22 year old year old female following up with a mallet finger.  I last saw Ramila on March 29, I initially saw her on March 16.  At her first visit she was put into a stack splint with instructions not to remove the splint for 6 weeks. Ramila has been very compliant.  She has caught the splint a couple of times on objects, although the splint has not come off.  Additional history: as documented      REVIEW OF SYSTEMS (5/3/2018)  10 point ROS of systems including Constitutional, Eyes, Respiratory, Cardiovascular, Gastroenterology, Genitourinary, Integumentary, Musculoskeletal, Psychiatric were all negative except for pertinent positives noted in my HPI.     PHYSICAL EXAM  Vitals:    05/03/18 1644   BP: 126/85   Pulse: 100     Stack splint was removed, extensor tendon and flexor tendon of the left fifth finger are intact.  Range of motion is stiff especially in flexion.,  Skin is normal.        ASSESSMENT & PLAN  Ms. Kaye is a 22 year old year old female following up with a mallet finger of the fifth finger of her left hand.  She is 7 weeks status post splinting.    I did buddy tape her fourth and fifth fingers today.  She should keep the buddy tape on for most of the day throughout the next week.  She can continue to use the hand in flexion and extension as tolerated.  I did demonstrate some light strengthening exercises for her to do.    If Ramila continues to do well she can follow-up as needed.    It was a pleasure seeing Ramila.        Bao Alan, , [facility]

## 2018-05-03 NOTE — NURSING NOTE
Ramila Kaye's goals for this visit include: Mallet finger on left hand  She requests these members of her care team be copied on today's visit information: yes    PCP: Gail Hyatt    Referring Provider:  No referring provider defined for this encounter.    Dana De La Rosa RN

## 2018-08-02 NOTE — PROGRESS NOTES
Outpatient Physical Therapy Discharge Note     Patient: Ramila Kaye  : 1995    Beginning/End Dates of Reporting Period:  18 to 18 when last seen.   DC written on 2018.  Total # of Rx sessions:      Referring Provider: Angela Wheeler Diagnosis: Impingement syndrome.      Client Self Report: P radiating down to R elbow. May have slept on it wrong. Not as much shooting P's, Less P w/ shifting car.     Objective Measurements:  Objective Measure: SPADI  Details: INITIALLY: 40.77     Objective Measure: AROM   Details: INITIALLLY:  Flex R 123, L 143; Abd R 137, L 169; ER R 53, L 77; IR R T6, L T5. Popping in R shoulder w/ IR. Pful arc on R.     Objective Measure: MMT:   Details: INITIALLY: R Abd at 0 and 90 4 w/ P, E.Can 4 w/ P, Elbow Flex 5-. Bicipital Tendonitis Negative     Objective Measure: Spec Tests.   Details: INITIALLY: NEER's w/ Flex and Abd, Botetourt's, Sulcus, K-H TEst.      Goals:  Goal Identifier 1   Goal Description STG: Pt will be able to sleep on her R side w/ P less than 3/10    Target Date 18   Date Met      Progress:     Goal Identifier 2   Goal Description STG: Pt will be able to wash her hair and her back 1/10 on Functional scale.   Target Date 18   Date Met      Progress:     Goal Identifier 3   Goal Description STG: Pt will be able to reach to a high shelf 1/10 on Functional scale.    Target Date 18   Date Met      Progress:     Goal Identifier 4   Goal Description LTG: Pt will be independen w/ HEP to re-strengthen R shoulder and improve ROM.    Target Date 06/15/18   Date Met      Progress:     Progress Toward Goals:   Not assessed this period.    Plan:  Discharge from therapy.    Discharge:    Reason for Discharge: Patient chooses to discontinue therapy.  Patient has failed to schedule further appointments.    Equipment Issued:      Discharge Plan: Patient to continue home program.  Pt to follow up w/MD as appropriate.   Janis Shultz, PT,  [facility] (#[id number])  [facility]           [phone]  Fax          [phone]  Appt #      [phone]

## 2018-08-02 NOTE — ADDENDUM NOTE
Encounter addended by: Janis Shultz, PT on: 8/2/2018  9:45 AM<BR>     Actions taken: Flowsheet accepted, Sign clinical note, Episode resolved

## 2018-10-03 ENCOUNTER — HOSPITAL ENCOUNTER (EMERGENCY)
Facility: CLINIC | Age: 23
Discharge: HOME | End: 2018-10-03
Attending: PHYSICIAN ASSISTANT | Admitting: PHYSICIAN ASSISTANT
Payer: COMMERCIAL

## 2018-10-03 ENCOUNTER — APPOINTMENT (OUTPATIENT)
Dept: GENERAL RADIOLOGY | Facility: CLINIC | Age: 23
End: 2018-10-03
Attending: PHYSICIAN ASSISTANT
Payer: COMMERCIAL

## 2018-10-03 VITALS
RESPIRATION RATE: 14 BRPM | HEIGHT: 69 IN | SYSTOLIC BLOOD PRESSURE: 131 MMHG | DIASTOLIC BLOOD PRESSURE: 80 MMHG | OXYGEN SATURATION: 97 % | TEMPERATURE: 98.4 F | WEIGHT: 230 LBS | BODY MASS INDEX: 34.07 KG/M2

## 2018-10-03 DIAGNOSIS — S13.4XXA WHIPLASH INJURY TO NECK, INITIAL ENCOUNTER: ICD-10-CM
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
## 2018-10-03 PROCEDURE — G0463 HOSPITAL OUTPT CLINIC VISIT: HCPCS

## 2018-10-03 PROCEDURE — 99213 OFFICE O/P EST LOW 20 MIN: CPT | Performed by: PHYSICIAN ASSISTANT

## 2018-10-03 PROCEDURE — 72072 X-RAY EXAM THORAC SPINE 3VWS: CPT

## 2018-10-03 PROCEDURE — 72040 X-RAY EXAM NECK SPINE 2-3 VW: CPT
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
## 2018-10-03 NOTE — ED AVS SNAPSHOT
[facility]    [address] [facility] [address]    Phone:  [phone]    Fax:  [phone]                                       Ramila Kaye   MRN: [id number]    Department:  [facility]   Date of Visit:  10/3/2018           After Visit Summary Signature Page     I have received my discharge instructions, and my questions have been answered. I have discussed any challenges I see with this plan with the nurse or doctor.    ..........................................................................................................................................  Patient/Patient Representative Signature      ..........................................................................................................................................  Patient Representative Print Name and Relationship to Patient    ..................................................               ................................................  Date                                   Time    ..........................................................................................................................................  Reviewed by Signature/Title    ...................................................              ..............................................  Date                                               Time          22EPIC Rev 08/18
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
## 2018-10-04 ASSESSMENT — ENCOUNTER SYMPTOMS
DIZZINESS: 0
SEIZURES: 0
EYE PAIN: 0
COLOR CHANGE: 0
SHORTNESS OF BREATH: 0
NECK PAIN: 1
COUGH: 0
PHOTOPHOBIA: 0
VOMITING: 0
NUMBNESS: 0
CHILLS: 0
ABDOMINAL PAIN: 0
EYE ITCHING: 0
NAUSEA: 0
WOUND: 0
FEVER: 0
HEADACHES: 0
LIGHT-HEADEDNESS: 0
WEAKNESS: 0
DIARRHEA: 0
BACK PAIN: 1

## 2018-10-05 ENCOUNTER — OFFICE VISIT (OUTPATIENT)
Dept: FAMILY MEDICINE | Facility: CLINIC | Age: 23
End: 2018-10-05
Payer: COMMERCIAL

## 2018-10-05 ENCOUNTER — RADIANT APPOINTMENT (OUTPATIENT)
Dept: GENERAL RADIOLOGY | Facility: CLINIC | Age: 23
End: 2018-10-05
Attending: FAMILY MEDICINE
Payer: COMMERCIAL

## 2018-10-05 VITALS
BODY MASS INDEX: 34.85 KG/M2 | HEART RATE: 95 BPM | TEMPERATURE: 97.4 F | OXYGEN SATURATION: 98 % | DIASTOLIC BLOOD PRESSURE: 80 MMHG | RESPIRATION RATE: 12 BRPM | SYSTOLIC BLOOD PRESSURE: 124 MMHG | WEIGHT: 236 LBS

## 2018-10-05 DIAGNOSIS — M25.552 HIP PAIN, LEFT: Primary | ICD-10-CM

## 2018-10-05 DIAGNOSIS — S16.1XXD STRAIN OF NECK MUSCLE, SUBSEQUENT ENCOUNTER: ICD-10-CM

## 2018-10-05 DIAGNOSIS — M25.552 HIP PAIN, LEFT: ICD-10-CM

## 2018-10-05 PROCEDURE — 73502 X-RAY EXAM HIP UNI 2-3 VIEWS: CPT | Mod: FY

## 2018-10-05 PROCEDURE — 99214 OFFICE O/P EST MOD 30 MIN: CPT | Performed by: FAMILY MEDICINE

## 2018-10-05 NOTE — LETTER
[facility]  [address] [facility] [address]  Phone: [phone]    October 5, 2018      Ramila Kaye  [address] [place] [id number]      Dear Ms. Kaye    For medical reasons you should be allowed to use a crutch at work from today until 10-14-15. Return to work with no restrictions on 10-15-18.       Sincerely,    / Bao Franco MD

## 2018-10-05 NOTE — MR AVS SNAPSHOT
After Visit Summary   10/5/2018    Ramila Kaye    MRN: [id number]           Patient Information     Date Of Birth          1995        Visit Information        Provider Department      10/5/2018 8:40 AM Bao Franco MD [facility]        Today's Diagnoses     Hip pain, left    -  1    Strain of neck muscle, subsequent encounter          Care Instructions    (M25.552) Hip pain, left  (primary encounter diagnosis)  Comment:   Plan: XR Hip Left 2-3 Views        Get the X-ray today and we will call the results and recommendations. Modify activities to avoid running and jumping and twisting.   Use ice on the anterior part of the left hip and use the crutch in the right hand for a few days, until not limping. See the work letter.     (S16.1XXD) Strain of neck muscle, subsequent encounter  Comment:   Plan: see above and modify activities. Use Tylenol and advil as needed. Follow up as needed.           Follow-ups after your visit        Future tests that were ordered for you today     Open Future Orders        Priority Expected Expires Ordered    XR Hip Left 2-3 Views Routine 10/5/2018 10/5/2019 10/5/2018            Who to contact     If you have questions or need follow up information about today's clinic visit or your schedule please contact [facility] directly at [phone].  Normal or non-critical lab and imaging results will be communicated to you by MyChart, letter or phone within 4 business days after the clinic has received the results. If you do not hear from us within 7 days, please contact the clinic through MyChart or phone. If you have a critical or abnormal lab result, we will notify you by phone as soon as possible.  Submit refill requests through [email or web address] or call your pharmacy and they will forward the refill request to us. Please allow 3 business days for your refill to be completed.          Additional Information About Your Visit        [email or web address]  Information     [email or web address] gives you secure access to your electronic health record. If you see a primary care provider, you can also send messages to your care team and make appointments. If you have questions, please call your primary care clinic.  If you do not have a primary care provider, please call [phone] and they will assist you.        Care EveryWhere ID     This is your Care EveryWhere ID. This could be used by other organizations to access your [facility] medical records  [id number]        Your Vitals Were     Pulse Temperature Respirations Last Period Pulse Oximetry Breastfeeding?    95 97.4  F (36.3  C) (Tympanic) 12 09/25/2018 98% No    BMI (Body Mass Index)                   34.85 kg/m2            Blood Pressure from Last 3 Encounters:   10/05/18 124/80   10/03/18 131/80   05/03/18 126/85    Weight from Last 3 Encounters:   10/05/18 236 lb (107 kg)   10/03/18 230 lb (104.3 kg)   04/16/18 248 lb (112.5 kg)               Primary Care Provider Office Phone # Fax #    Gail Hyatt PA-C [phone] [address] [facility] [address]        Equal Access to Services     IVANNA EUBANKS AH: Hadii amada sro Socatalinaali, waaxda luqadaha, qaybta kaalmada adeegyada, bri etienne. So [facility] [phone].    ATENCIÓN: Si habla español, tiene a regalado disposición servicios gratuitos de asistencia lingüística. Jordan al [phone].    We comply with applicable federal civil rights laws and [place] laws. We do not discriminate on the basis of race, color, national origin, age, disability, sex, sexual orientation, or gender identity.            Thank you!     Thank you for choosing [facility]  for your care. Our goal is always to provide you with excellent care. Hearing back from our patients is one way we can continue to improve our services. Please take a few minutes to complete the written survey that you may receive in the mail after your visit  with us. Thank you!             Your Updated Medication List - Protect others around you: Learn how to safely use, store and throw away your medicines at www.disposemymeds.org.      Notice  As of 10/5/2018  9:23 AM    You have not been prescribed any medications.

## 2018-10-05 NOTE — PATIENT INSTRUCTIONS
(S50.716) Hip pain, left  (primary encounter diagnosis)  Comment:   Plan: XR Hip Left 2-3 Views        Get the X-ray today and we will call the results and recommendations. Modify activities to avoid running and jumping and twisting.   Use ice on the anterior part of the left hip and use the crutch in the right hand for a few days, until not limping. See the work letter.     (S16.1XXD) Strain of neck muscle, subsequent encounter  Comment:   Plan: see above and modify activities. Use Tylenol and advil as needed. Follow up as needed.
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
## 2018-10-12 ENCOUNTER — OFFICE VISIT (OUTPATIENT)
Dept: FAMILY MEDICINE | Facility: CLINIC | Age: 23
End: 2018-10-12
Payer: COMMERCIAL

## 2018-10-12 VITALS
RESPIRATION RATE: 16 BRPM | OXYGEN SATURATION: 98 % | HEART RATE: 91 BPM | TEMPERATURE: 97.7 F | SYSTOLIC BLOOD PRESSURE: 120 MMHG | DIASTOLIC BLOOD PRESSURE: 74 MMHG

## 2018-10-12 DIAGNOSIS — S16.1XXD STRAIN OF NECK MUSCLE, SUBSEQUENT ENCOUNTER: ICD-10-CM

## 2018-10-12 DIAGNOSIS — M75.41 IMPINGEMENT SYNDROME, SHOULDER, RIGHT: Primary | ICD-10-CM

## 2018-10-12 DIAGNOSIS — M70.62 TROCHANTERIC BURSITIS OF LEFT HIP: ICD-10-CM

## 2018-10-12 DIAGNOSIS — M75.21 BICEPS TENDONITIS, RIGHT: ICD-10-CM

## 2018-10-12 PROCEDURE — 99214 OFFICE O/P EST MOD 30 MIN: CPT | Performed by: FAMILY MEDICINE

## 2018-10-12 NOTE — PATIENT INSTRUCTIONS
Thank you for choosing [facility].  You may be receiving a survey in the mail from Gennaro Patel regarding your visit today.  Please take a few minutes to complete and return the survey to let us know how we are doing.      If you have questions or concerns, please contact us via Net [phone] or you can contact your care team at [phone].    Our Clinic hours are:  Monday 6:40 am  to 7:00 pm  Tuesday -Friday 6:40 am to 5:00 pm    The [facility] outpatient lab hours are:  Monday - Friday 6:10 am to 4:45 pm  Saturdays 7:00 am to 11:00 am  Appointments are required, call [phone]    If you have clinical questions after hours or would like to schedule an appointment,  call the clinic at [phone].      (M75.41) Impingement syndrome, shoulder, right  (primary encounter diagnosis)  Comment:   Plan: Instructions given on diagnoses for the use of ice and advil and Tylenol.   Modify activities and avoid repetitive motions of the right arm out to the side, overhead and throwing.   Recheck as needed. Consideration for a cortisone shot could be done.     (M75.21) Biceps tendonitis, right  Comment:   Plan: see above.     (M70.62) Trochanteric bursitis of left hip  Comment:   Plan: see above.     (S16.1XXD) Strain of neck muscle, subsequent encounter  Comment:   Plan: modify activities with the neck and take frequent rest periods.   See above. If the muscles are tight, then Physical therapy could help and just call our clinic RN at [phone].

## 2018-10-12 NOTE — MR AVS SNAPSHOT
After Visit Summary   10/12/2018    Ramila Kaye    MRN: [id number]           Patient Information     Date Of Birth          1995        Visit Information        Provider Department      10/12/2018 7:40 AM Bao Franco MD [facility]        Today's Diagnoses     Impingement syndrome, shoulder, right    -  1    Biceps tendonitis, right        Strain of neck muscle, subsequent encounter        Trochanteric bursitis of left hip          Care Instructions          Thank you for choosing [facility].  You may be receiving a survey in the mail from Gennaro Patel regarding your visit today.  Please take a few minutes to complete and return the survey to let us know how we are doing.      If you have questions or concerns, please contact us via [employer] or you can contact your care team at [phone].    Our Clinic hours are:  Monday 6:40 am  to 7:00 pm  Tuesday -Friday 6:40 am to 5:00 pm    The [place] outpatient lab hours are:  Monday - Friday 6:10 am to 4:45 pm  Saturdays 7:00 am to 11:00 am  Appointments are required, call [phone]    If you have clinical questions after hours or would like to schedule an appointment,  call the clinic at [phone].      (M75.41) Impingement syndrome, shoulder, right  (primary encounter diagnosis)  Comment:   Plan: Instructions given on diagnoses for the use of ice and advil and Tylenol.   Modify activities and avoid repetitive motions of the right arm out to the side, overhead and throwing.   Recheck as needed. Consideration for a cortisone shot could be done.     (M75.21) Biceps tendonitis, right  Comment:   Plan: see above.     (M70.62) Trochanteric bursitis of left hip  Comment:   Plan: see above.     (S16.1XXD) Strain of neck muscle, subsequent encounter  Comment:   Plan: modify activities with the neck and take frequent rest periods.   See above. If the muscles are tight, then Physical therapy could help and just call our clinic  RN at [phone].           Follow-ups after your visit        Who to contact     If you have questions or need follow up information about today's clinic visit or your schedule please contact [facility] directly at [phone].  Normal or non-critical lab and imaging results will be communicated to you by [employer], letter or phone within 4 business days after the clinic has received the results. If you do not hear from us within 7 days, please contact the clinic through [employer] or phone. If you have a critical or abnormal lab result, we will notify you by phone as soon as possible.  Submit refill requests through [employer] or call your pharmacy and they will forward the refill request to us. Please allow 3 business days for your refill to be completed.          Additional Information About Your Visit        [employer] Information     [employer] gives you secure access to your electronic health record. If you see a primary care provider, you can also send messages to your care team and make appointments. If you have questions, please call your primary care clinic.  If you do not have a primary care provider, please call [phone] and they will assist you.        Care EveryWhere ID     This is your Care EveryWhere ID. This could be used by other organizations to access your [facility] medical records  [id number]        Your Vitals Were     Pulse Temperature Respirations Last Period Pulse Oximetry       91 97.7  F (36.5  C) (Tympanic) 16 09/25/2018 98%        Blood Pressure from Last 3 Encounters:   10/12/18 120/74   10/05/18 124/80   10/03/18 131/80    Weight from Last 3 Encounters:   10/05/18 236 lb (107 kg)   10/03/18 230 lb (104.3 kg)   04/16/18 248 lb (112.5 kg)              Today, you had the following     No orders found for display       Primary Care Provider Office Phone # Fax #    Gail Hyatt PA-C [phone] [address] [place] [address]        Equal Access to  Services     [facility] : Hadii amada Escalante, warameshda jassadaha, qacarolina lucas, bri etienne. So [facility] [phone].    ATENCIÓN: Si habla español, tiene a regalado disposición servicios gratuitos de asistencia lingüística. Llame al [phone].    We comply with applicable federal civil rights laws and [place] laws. We do not discriminate on the basis of race, color, national origin, age, disability, sex, sexual orientation, or gender identity.            Thank you!     Thank you for choosing [facility]  for your care. Our goal is always to provide you with excellent care. Hearing back from our patients is one way we can continue to improve our services. Please take a few minutes to complete the written survey that you may receive in the mail after your visit with us. Thank you!             Your Updated Medication List - Protect others around you: Learn how to safely use, store and throw away your medicines at www.disposemymeds.org.      Notice  As of 10/12/2018  8:29 AM    You have not been prescribed any medications.

## 2019-01-03 ENCOUNTER — OFFICE VISIT (OUTPATIENT)
Dept: URGENT CARE | Facility: URGENT CARE | Age: 24
End: 2019-01-03
Payer: COMMERCIAL

## 2019-01-03 VITALS
HEART RATE: 100 BPM | OXYGEN SATURATION: 99 % | SYSTOLIC BLOOD PRESSURE: 124 MMHG | DIASTOLIC BLOOD PRESSURE: 86 MMHG | WEIGHT: 261 LBS | BODY MASS INDEX: 38.54 KG/M2

## 2019-01-03 DIAGNOSIS — S29.012A UPPER BACK STRAIN, INITIAL ENCOUNTER: ICD-10-CM

## 2019-01-03 DIAGNOSIS — M25.561 TENDERNESS OF RIGHT KNEE: ICD-10-CM
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
## 2019-01-03 DIAGNOSIS — S39.012A STRAIN OF FASCIA OF LOWER BACK: ICD-10-CM

## 2019-01-03 PROCEDURE — 99214 OFFICE O/P EST MOD 30 MIN: CPT | Performed by: PHYSICIAN ASSISTANT

## 2019-01-03 RX ORDER — METHOCARBAMOL 750 MG/1
750 TABLET, FILM COATED ORAL 3 TIMES DAILY PRN
Qty: 30 TABLET | Refills: 0 | Status: SHIPPED | OUTPATIENT
Start: 2019-01-03 | End: 2019-01-08 | Stop reason: SINTOL

## 2019-01-03 RX ORDER — IBUPROFEN 800 MG/1
800 TABLET, FILM COATED ORAL EVERY 8 HOURS PRN
Qty: 30 TABLET | Refills: 0 | Status: SHIPPED | OUTPATIENT
Start: 2019-01-03 | End: 2019-10-07

## 2019-01-03 ASSESSMENT — PAIN SCALES - GENERAL: PAINLEVEL: MODERATE PAIN (4)

## 2019-01-08 ENCOUNTER — OFFICE VISIT (OUTPATIENT)
Dept: FAMILY MEDICINE | Facility: CLINIC | Age: 24
End: 2019-01-08
Payer: COMMERCIAL

## 2019-01-08 VITALS
SYSTOLIC BLOOD PRESSURE: 90 MMHG | WEIGHT: 262 LBS | HEART RATE: 89 BPM | BODY MASS INDEX: 38.69 KG/M2 | TEMPERATURE: 98.5 F | OXYGEN SATURATION: 98 % | DIASTOLIC BLOOD PRESSURE: 58 MMHG | RESPIRATION RATE: 15 BRPM

## 2019-01-08 DIAGNOSIS — M54.6 ACUTE RIGHT-SIDED THORACIC BACK PAIN: ICD-10-CM

## 2019-01-08 DIAGNOSIS — M54.2 NECK PAIN: Primary | ICD-10-CM
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
## 2019-01-08 PROCEDURE — 99214 OFFICE O/P EST MOD 30 MIN: CPT | Performed by: NURSE PRACTITIONER

## 2019-01-08 NOTE — PATIENT INSTRUCTIONS
Thank you for choosing [facility].  You may be receiving a survey in the mail from Gennaro Patel regarding your visit today.  Please take a few minutes to complete and return the survey to let us know how we are doing.      If you have questions or concerns, please contact us via [employer] or you can contact your care team at [phone].    Our Clinic hours are:  Monday 6:40 am  to 7:00 pm  Tuesday -Friday 6:40 am to 5:00 pm    The [place] outpatient lab hours are:  Monday - Friday 6:10 am to 4:45 pm  Saturdays 7:00 am to 11:00 am  Appointments are required, call [phone]    If you have clinical questions after hours or would like to schedule an appointment,  call the clinic at [phone].
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
## 2019-01-10 ENCOUNTER — OFFICE VISIT (OUTPATIENT)
Dept: ORTHOPEDICS | Facility: CLINIC | Age: 24
End: 2019-01-10
Payer: COMMERCIAL

## 2019-01-10 ENCOUNTER — ANCILLARY PROCEDURE (OUTPATIENT)
Dept: GENERAL RADIOLOGY | Facility: CLINIC | Age: 24
End: 2019-01-10
Payer: COMMERCIAL

## 2019-01-10 VITALS — BODY MASS INDEX: 38.69 KG/M2 | WEIGHT: 262 LBS

## 2019-01-10 DIAGNOSIS — M54.2 CERVICALGIA: ICD-10-CM

## 2019-01-10 DIAGNOSIS — M54.2 CERVICALGIA: Primary | ICD-10-CM

## 2019-01-10 PROCEDURE — 99203 OFFICE O/P NEW LOW 30 MIN: CPT | Performed by: FAMILY MEDICINE

## 2019-01-10 PROCEDURE — 72040 X-RAY EXAM NECK SPINE 2-3 VW: CPT
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
## 2019-01-10 NOTE — PATIENT INSTRUCTIONS
Patient Education     Understanding Cervical Strain    There are 7 bones (vertebrae) in the neck that are part of the spine. These are called the cervical spine. Cervical strain is a medical term for neck pain. The neck has several layers of muscles. These are connected with tendons to the cervical spine and other bones. Neck pain is often the result of injury to these muscles and tendons.  Causes of cervical strain  Different types of stress on the neck can damage muscles and tendons (soft tissues) and cause cervical strain. Cervical tissues can be damaged by:    The neck being forced past its normal range of motion, such as in a car accident or sports injury    Constant, low-level stress, such as from poor posture or a poorly set-up workspace  Symptoms of cervical strain  These may include:    Neck pain or stiffness    Pain in the shoulders or upper back    Muscle spasms    Headache, often starting at the base of the neck    Irritability, difficulty concentrating, or sleeplessness  Treatment for cervical strain  This problem often gets better on its own. Treatments aim to reduce pain and inflammation and increase the range of motion of the neck. Possible treatments include:    Over-the-counter or prescription pain medicine. These help relieve pain and inflammation.    Stretching exercises to decrease neck stiffness.    Massage to decrease neck stiffness.    Cold or heat pack. These help reduce pain and swelling.  Call 911  Call 911 right away if you have any of these:    Face drooping or numbness    Numbness or weakness, especially in the arms or on one side    Slurred speech or difficulty speaking    Blurred vision   When to call your healthcare provider  Call your healthcare provider right away if you have any of these:    Fever of 100.4 F (38 C) or higher, or as directed    Pain or stiffness that gets worse    Symptoms that don t get better, or get worse    Numbness, tingling, weakness or shooting pains into  the arms or legs    New symptoms  Date Last Reviewed: 3/10/2016    [id number] [employer]. [address] [place] [address], [place] [address]. All rights reserved. This information is not intended as a substitute for professional medical care. Always follow your healthcare professional's instructions.
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
## 2019-01-21 ENCOUNTER — HOSPITAL ENCOUNTER (OUTPATIENT)
Dept: PHYSICAL THERAPY | Facility: CLINIC | Age: 24
Setting detail: THERAPIES SERIES
End: 2019-01-21
Attending: FAMILY MEDICINE
Payer: COMMERCIAL

## 2019-01-21 DIAGNOSIS — M54.2 CERVICALGIA: ICD-10-CM

## 2019-01-21 PROCEDURE — 97161 PT EVAL LOW COMPLEX 20 MIN: CPT | Mod: GP | Performed by: PHYSICAL THERAPIST

## 2019-01-21 PROCEDURE — 97110 THERAPEUTIC EXERCISES: CPT | Mod: GP | Performed by: PHYSICAL THERAPIST
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
## 2019-02-21 NOTE — PROGRESS NOTES
Patient did not return for follow up treatments after Evaluation. Goal status and current objective information is therefore unknown.  The daily note from the patient's last visit will serve as the discharge note. Discharge from PT services at this time for this episode of treatment. Please see attached documentation under this episode of care for further information including dates of service, start of care date, referring physician, Dx, treatment plan, treatments, etc.      Thank you for your referral.     Please Contact me with any questions or concerns. Thank you for for patience and cooperation.     Mike Mccurdy PT, DPT  Flexible Workforce Physical Therapist   [facility]  [email or web address]

## 2019-02-21 NOTE — ADDENDUM NOTE
Encounter addended by: Mike Mccurdy PT on: 2/21/2019 11:35 AM   Actions taken: Sign clinical note, Episode resolved

## 2019-08-13 ENCOUNTER — OFFICE VISIT (OUTPATIENT)
Dept: FAMILY MEDICINE | Facility: CLINIC | Age: 24
End: 2019-08-13
Payer: COMMERCIAL

## 2019-08-13 ENCOUNTER — NURSE TRIAGE (OUTPATIENT)
Dept: NURSING | Facility: CLINIC | Age: 24
End: 2019-08-13

## 2019-08-13 VITALS
OXYGEN SATURATION: 96 % | HEIGHT: 69 IN | SYSTOLIC BLOOD PRESSURE: 115 MMHG | DIASTOLIC BLOOD PRESSURE: 80 MMHG | HEART RATE: 100 BPM | TEMPERATURE: 98.2 F | WEIGHT: 265 LBS | BODY MASS INDEX: 39.25 KG/M2

## 2019-08-13 DIAGNOSIS — Z23 NEED FOR TETANUS BOOSTER: ICD-10-CM

## 2019-08-13 DIAGNOSIS — R10.13 EPIGASTRIC PAIN: Primary | ICD-10-CM

## 2019-08-13 LAB
BASOPHILS # BLD AUTO: 0 10E9/L (ref 0–0.2)
BASOPHILS NFR BLD AUTO: 0.2 %
DIFFERENTIAL METHOD BLD: ABNORMAL
EOSINOPHIL # BLD AUTO: 0.2 10E9/L (ref 0–0.7)
EOSINOPHIL NFR BLD AUTO: 2.8 %
ERYTHROCYTE [DISTWIDTH] IN BLOOD BY AUTOMATED COUNT: 14 % (ref 10–15)
HCT VFR BLD AUTO: 42.8 % (ref 35–47)
HGB BLD-MCNC: 13.8 G/DL (ref 11.7–15.7)
LYMPHOCYTES # BLD AUTO: 1.6 10E9/L (ref 0.8–5.3)
LYMPHOCYTES NFR BLD AUTO: 18.7 %
MCH RBC QN AUTO: 26.5 PG (ref 26.5–33)
MCHC RBC AUTO-ENTMCNC: 32.2 G/DL (ref 31.5–36.5)
MCV RBC AUTO: 82 FL (ref 78–100)
MONOCYTES # BLD AUTO: 0.7 10E9/L (ref 0–1.3)
MONOCYTES NFR BLD AUTO: 7.5 %
NEUTROPHILS # BLD AUTO: 6.2 10E9/L (ref 1.6–8.3)
NEUTROPHILS NFR BLD AUTO: 70.8 %
PLATELET # BLD AUTO: 314 10E9/L (ref 150–450)
RBC # BLD AUTO: 5.21 10E12/L (ref 3.8–5.2)
WBC # BLD AUTO: 8.7 10E9/L (ref 4–11)

## 2019-08-13 PROCEDURE — 90715 TDAP VACCINE 7 YRS/> IM: CPT | Performed by: NURSE PRACTITIONER

## 2019-08-13 PROCEDURE — 99214 OFFICE O/P EST MOD 30 MIN: CPT | Mod: 25 | Performed by: NURSE PRACTITIONER

## 2019-08-13 PROCEDURE — 80053 COMPREHEN METABOLIC PANEL: CPT | Performed by: NURSE PRACTITIONER

## 2019-08-13 PROCEDURE — 90471 IMMUNIZATION ADMIN: CPT | Performed by: NURSE PRACTITIONER

## 2019-08-13 PROCEDURE — 87338 HPYLORI STOOL AG IA: CPT | Performed by: NURSE PRACTITIONER

## 2019-08-13 PROCEDURE — 85025 COMPLETE CBC W/AUTO DIFF WBC: CPT | Performed by: NURSE PRACTITIONER

## 2019-08-13 PROCEDURE — 36415 COLL VENOUS BLD VENIPUNCTURE: CPT | Performed by: NURSE PRACTITIONER

## 2019-08-13 ASSESSMENT — MIFFLIN-ST. JEOR: SCORE: 2016.41

## 2019-08-13 NOTE — TELEPHONE ENCOUNTER
Pt reports intermittent upper midline/L sided abdominal pain since yesterday. Pain at present time is a 2/10, started 5 minutes ago. Episodes come 2-3 times per day and last minutes - hours at a time.      She is also feeling nauseous, fatigued, and having mild weakness. She denies SOB, fever, vomiting.     Disposition:  See a provider within 24 hours.  She verbalized understanding and had no further questions, transferred to scheduling.     Zaina Gold RN/LAURA    Reason for Disposition    [1] MODERATE pain (e.g., interferes with normal activities) AND [2] comes and goes (cramps) AND [3] present > 24 hours  (Exception: pain with Vomiting or Diarrhea - see that Guideline)    Additional Information    Negative: Severe difficulty breathing (e.g., struggling for each breath, speaks in single words)    Negative: Shock suspected (e.g., cold/pale/clammy skin, too weak to stand, low BP, rapid pulse)    Negative: Difficult to awaken or acting confused (e.g., disoriented, slurred speech)    Negative: Passed out (i.e., lost consciousness, collapsed and was not responding)    Negative: Visible sweat on face or sweat dripping down face    Negative: Sounds like a life-threatening emergency to the triager    Negative: Followed an abdomen (stomach) injury    Negative: Chest pain    Negative: [1] SEVERE pain (e.g., excruciating) AND [2] present > 1 hour    Negative: [1] Pain lasts > 10 minutes AND [2] age > 50    Negative: [1] Pain lasts > 10 minutes AND [2] age > 40 AND [3] associated chest, arm, neck, upper back or jaw pain    Negative: [1] Pain lasts > 10 minutes AND [2] age > 35 AND [3] at least one cardiac risk factor (i.e., hypertension, diabetes, obesity, smoker or strong family history of heart disease)    Negative: [1] Pain lasts > 10 minutes AND [2] history of heart disease (i.e., heart attack, bypass surgery, angina, angioplasty, CHF; not just a heart murmur)    Negative: [1] Pain lasts > 10 minutes AND [2] difficulty  "breathing    Negative: [1] Vomiting AND [2] contains red blood  (Exception: few streaks and only occurred once)    Negative: [1] Vomiting AND [2] contains black (\"coffee ground\") material    Negative: Blood in bowel movements  (Exception: Blood on surface of BM with constipation)    Negative: Black or tarry bowel movements  (Exception: chronic-unchanged  black-grey bowel movements AND is taking iron pills or Pepto-bismol)    Negative: [1] Pregnant > 24 weeks AND [2] hand or face swelling    Negative: Patient sounds very sick or weak to the triager    Negative: [1] MILD-MODERATE pain AND [2] constant AND [3] present > 2 hours    Negative: [1] MILD-MODERATE pain AND [2] not relieved by antacids    Negative: [1] Vomiting AND [2] contains bile (green color)    Negative: [1] Vomiting AND [2] abdomen looks much more swollen than usual    Negative: White of the eyes have turned yellow (i.e., jaundice)    Negative: Fever > 103 F (39.4 C)    Negative: [1] Fever > 101 F (38.3 C) AND [2] age > 60    Negative: [1] Fever > 100.0 F (37.8 C) AND [2] bedridden (e.g., nursing home patient, CVA, chronic illness, recovering from surgery)    Negative: [1] Fever > 100.0 F (37.8 C) AND [2] diabetes mellitus or weak immune system (e.g., HIV positive, cancer chemo, splenectomy, chronic steroids)    Protocols used: ABDOMINAL PAIN - UPPER-A-AH      "

## 2019-08-13 NOTE — NURSING NOTE
Screening Questionnaire for Adult Immunization    Are you sick today?   No   Do you have allergies to medications, food, a vaccine component or latex?   No   Have you ever had a serious reaction after receiving a vaccination?   No   Do you have a long-term health problem with heart disease, lung disease, asthma, kidney disease, metabolic disease (e.g. diabetes), anemia, or other blood disorder?   No   Do you have cancer, leukemia, HIV/AIDS, or any other immune system problem?   No   In the past 3 months, have you taken medications that affect  your immune system, such as prednisone, other steroids, or anticancer drugs; drugs for the treatment of rheumatoid arthritis, Crohn s disease, or psoriasis; or have you had radiation treatments?   No   Have you had a seizure, or a brain or other nervous system problem?   No   During the past year, have you received a transfusion of blood or blood     products, or been given immune (gamma) globulin or antiviral drug?   No   For women: Are you pregnant or is there a chance you could become        pregnant during the next month?   No   Have you received any vaccinations in the past 4 weeks?   No     Immunization questionnaire answers were all negative.        Per orders of Dr. Mindy Wagner, injection of Tdap given by Brandon Muniz. Patient instructed to remain in clinic for 15 minutes afterwards, and to report any adverse reaction to me immediately.       Screening performed by Brandon Muniz on 8/13/2019 at 2:19 PM.

## 2019-08-13 NOTE — PROGRESS NOTES
Subjective     Ramila Kaye is a 24 year old female who presents to clinic today for the following health issues:    HPI   ABDOMINAL   PAIN     Onset: 2 weeks, getting worse in the past 2 days     Description:   Character: Sharp at times and Dull ache  Location: epigastric region  Radiation: None    Intensity: moderate, severe    Progression of Symptoms:  worsening and intermittent    Accompanying Signs & Symptoms:  Fever/Chills?: YES- hot flashes  Gas/Bloating: YES- gas  Nausea: YES  Vomitting: no   Diarrhea?: no   Constipation:no   Dysuria or Hematuria: no    History:   Trauma: YES- gallbladder removal in October, 2017  Previous similar pain: no    Previous tests done: none    Precipitating factors:   Does the pain change with:     Food: YES- getting worse      BM: no     Urination: no     Alleviating factors:  Relaxing     Therapies Tried and outcome: none    LMP:  7/17/2019   Reports a 2-week history of epigastric pains that come and go.  She has accompanying nausea.  Eating does seem to help but then she will lie down and she will wake up with a gnawing feeling in her gut.  She denies fever, chills, constipation her last bowel movement was this morning.  No blood in stool or urine.  She had her gallbladder removed in 2017.  Her abdominal pain is quite different from her gallbladder pain.  She is not sexually active.  She is due to get her cycle in the next week.  Right  She does not smoke tobacco.  Patient Active Problem List   Diagnosis     CARDIOVASCULAR SCREENING; LDL GOAL LESS THAN 160     Immunization not carried out - Declines HPV vaccine     Past Surgical History:   Procedure Laterality Date     LAPAROSCOPIC CHOLECYSTECTOMY N/A 10/25/2017    Procedure: LAPAROSCOPIC CHOLECYSTECTOMY;  Laparoscopic Cholecystectomy;  Surgeon: Frank Servin MD;  Location: [facility] OR     NO HISTORY OF SURGERY         Social History     Tobacco Use     Smoking status: Never Smoker     Smokeless tobacco: Never Used  "  Substance Use Topics     Alcohol use: Yes     Family History   Problem Relation Age of Onset     Coronary Artery Disease Maternal Grandmother      Glaucoma Paternal Grandmother      GERD Brother            Reviewed and updated as needed this visit by Provider         Review of Systems   ROS COMP: Constitutional, HEENT, cardiovascular, pulmonary, GI, , musculoskeletal, neuro, skin, endocrine and psych systems are negative, except as otherwise noted.      Objective    /80   Pulse 100   Temp 98.2  F (36.8  C) (Oral)   Ht 1.753 m (5' 9\")   Wt 120.2 kg (265 lb)   LMP 07/17/2019 (Approximate)   SpO2 96%   BMI 39.13 kg/m    Body mass index is 39.13 kg/m .  Physical Exam   GENERAL: healthy, alert and no distress  EYES: Eyes grossly normal to inspection, PERRL and conjunctivae and sclerae normal  NECK: no adenopathy, no asymmetry, masses, or scars and thyroid normal to palpation  RESP: lungs clear to auscultation - no rales, rhonchi or wheezes  CV: regular rate and rhythm, normal S1 S2, no S3 or S4, no murmur, click or rub, no peripheral edema and peripheral pulses strong  ABDOMEN: soft, epigastric tenderness, no hepatosplenomegaly, no masses and bowel sounds normal  MS: no gross musculoskeletal defects noted, no edema    Diagnostic Test Results:  Labs reviewed in Epic  Results for orders placed or performed in visit on 08/13/19 (from the past 24 hour(s))   CBC with platelets and differential   Result Value Ref Range    WBC 8.7 4.0 - 11.0 10e9/L    RBC Count 5.21 (H) 3.8 - 5.2 10e12/L    Hemoglobin 13.8 11.7 - 15.7 g/dL    Hematocrit 42.8 35.0 - 47.0 %    MCV 82 78 - 100 fl    MCH 26.5 26.5 - 33.0 pg    MCHC 32.2 31.5 - 36.5 g/dL    RDW 14.0 10.0 - 15.0 %    Platelet Count 314 150 - 450 10e9/L    % Neutrophils 70.8 %    % Lymphocytes 18.7 %    % Monocytes 7.5 %    % Eosinophils 2.8 %    % Basophils 0.2 %    Absolute Neutrophil 6.2 1.6 - 8.3 10e9/L    Absolute Lymphocytes 1.6 0.8 - 5.3 10e9/L    Absolute " "Monocytes 0.7 0.0 - 1.3 10e9/L    Absolute Eosinophils 0.2 0.0 - 0.7 10e9/L    Absolute Basophils 0.0 0.0 - 0.2 10e9/L    Diff Method Automated Method            Assessment & Plan     1. Epigastric pain  2-week history of epigastric pain with some nausea in the absence of fever, vomiting, or diarrhea.  She has epigastric tenderness on exam.  Symptoms consistent with gastric ulcer.  Recommend she avoid acidic and spicy foods.  Start omeprazole 20 mg daily.  CBC returned normal.  H. pylori treatment pending results.  - CBC with platelets and differential  - Comprehensive metabolic panel (BMP + Alb, Alk Phos, ALT, AST, Total. Bili, TP)  - H Pylori antigen, stool; Future  - omeprazole (PRILOSEC) 20 MG DR capsule; Take 1 capsule (20 mg) by mouth daily  Dispense: 30 capsule; Refill: 1    2. Need for tetanus booster    - TDAP VACCINE (ADACEL)     BMI:   Estimated body mass index is 39.13 kg/m  as calculated from the following:    Height as of this encounter: 1.753 m (5' 9\").    Weight as of this encounter: 120.2 kg (265 lb).   Weight management plan: Discussed healthy diet and exercise guidelines      No follow-ups on file.    RUI Walker [facility]    "

## 2019-08-13 NOTE — PATIENT INSTRUCTIONS
[facility]   Discharged by : Keturah Figueroa MA    Paper scripts provided to patient : no     If you have any questions regarding your visit please contact your care team:     Team Gold                Clinic Hours Telephone Number     Dr. Lisa Wagner, CNP 7am-7pm  Monday - Thursday   7am-5pm  Fridays  [phone]   (Appointment scheduling available 24/7)     RN Line  [phone] option 2     Urgent Care - [place] - 11am-9pm Monday-Friday Saturday-Sunday- 9am-5pm     [place] -   5pm-9pm Monday-Friday Saturday-Sunday- 9am-5pm    [phone] - [place]    [phone] - [place]     For a Price Quote for your services, please call our [employer] Price Line at [phone].     What options do I have for visits at the clinic other than the traditional office visit?     To expand how we care for you, many of our providers are utilizing electronic visits (e-visits) and telephone visits, when medically appropriate, for interactions with their patients rather than a visit in the clinic. We also offer nurse visits for many medical concerns. Just like any other service, we will bill your insurance company for this type of visit based on time spent on the phone with your provider. Not all insurance companies cover these visits. Please check with your medical insurance if this type of visit is covered. You will be responsible for any charges that are not paid by your insurance.   E-visits via [employer]: generally incur a $45.00 fee.     Telephone visits:  Time spent on the phone: *charged based on time that is spent on the phone in increments of 10 minutes. Estimated cost:   5-10 mins $30.00   11-20 mins. $59.00   21-30 mins. $85.00     Use [employer] (secure email communication and access to your chart) to send your primary care provider a message or make an appointment. Ask someone on your Team how to sign up for [employer].     As  always, Thank you for trusting us with your health care needs!    [facility]:    Scheduling Appointments  Adalid Mccall [facility]  Call: [phone]    Pre[facility]  Call: [phone]    [facility]  Call: [phone]    For Gastroenterology referrals   [facility]   Clinics and Surgery Center, 4th Floor   [address] [place] [address]   Appointments: [phone]    WHERE TO GO FOR CARE?  Clinic    Make an appointment if you:       Are sick (cold, cough, flu, sore throat, earache or in pain).       Have a small injury (sprain, small cut, burn or broken bone).       Need a physical exam, Pap smear, vaccine or prescription refill.       Have questions about your health or medicines.    To reach us:      Call [phone] ([phone]). Open 24 hours every day. (For counseling services, call [phone].)    Log into [employer] at [employer]. (Visit [email or web address] to create an account.) Hospital emergency room    An emergency is a serious or life- threatening problem that must be treated right away.    Call 494 or get to the hospital if you have:      Very bad or sudden:            - Chest pain or pressure         - Bleeding         - Head or belly pain         - Dizziness or trouble seeing, walking or                          Speaking      Problems breathing      Blood in your vomit or you are coughing up blood      A major injury (knocked out, loss of a finger or limb, rape, broken bone protruding from skin)    A mental health crisis. (Or call the Mental Health Crisis line at 1-915.835.7468 or Suicide Prevention Hotline at 1-584.864.1410.)    Open 24 hours every day. You don't need an appointment.     Urgent care    Visit urgent care for sickness or small injuries when the clinic is closed. You don't need an appointment. To check hours or find an urgent care near you, visit  [email or web address]. Online care    Get online care from [facility] for more than 70 common problems, like colds, allergies and infections. Open 24 hours every day at:   [email or web address]   Need help deciding?    For advice about where to be seen, you may call your clinic and ask to speak with a nurse. We're here for you 24 hours every day.         If you are deaf or hard of hearing, please let us know. We provide many free services including sign language interpreters, oral interpreters, TTYs, telephone amplifiers, note takers and written materials.

## 2019-08-14 DIAGNOSIS — R10.13 EPIGASTRIC PAIN: ICD-10-CM

## 2019-08-14 LAB
ALBUMIN SERPL-MCNC: 4.3 G/DL (ref 3.4–5)
ALP SERPL-CCNC: 65 U/L (ref 40–150)
ALT SERPL W P-5'-P-CCNC: 32 U/L (ref 0–50)
ANION GAP SERPL CALCULATED.3IONS-SCNC: 6 MMOL/L (ref 3–14)
AST SERPL W P-5'-P-CCNC: 21 U/L (ref 0–45)
BILIRUB SERPL-MCNC: 1 MG/DL (ref 0.2–1.3)
BUN SERPL-MCNC: 7 MG/DL (ref 7–30)
CALCIUM SERPL-MCNC: 8.9 MG/DL (ref 8.5–10.1)
CHLORIDE SERPL-SCNC: 111 MMOL/L (ref 94–109)
CO2 SERPL-SCNC: 23 MMOL/L (ref 20–32)
CREAT SERPL-MCNC: 0.71 MG/DL (ref 0.52–1.04)
GFR SERPL CREATININE-BSD FRML MDRD: >90 ML/MIN/{1.73_M2}
GLUCOSE SERPL-MCNC: 77 MG/DL (ref 70–99)
POTASSIUM SERPL-SCNC: 4 MMOL/L (ref 3.4–5.3)
PROT SERPL-MCNC: 7.8 G/DL (ref 6.8–8.8)
SODIUM SERPL-SCNC: 140 MMOL/L (ref 133–144)

## 2019-08-15 LAB
H PYLORI AG STL QL IA: NORMAL
SPECIMEN SOURCE: NORMAL

## 2019-10-07 ENCOUNTER — OFFICE VISIT (OUTPATIENT)
Dept: FAMILY MEDICINE | Facility: CLINIC | Age: 24
End: 2019-10-07
Payer: COMMERCIAL

## 2019-10-07 VITALS
WEIGHT: 261.2 LBS | BODY MASS INDEX: 38.69 KG/M2 | DIASTOLIC BLOOD PRESSURE: 79 MMHG | TEMPERATURE: 99.9 F | SYSTOLIC BLOOD PRESSURE: 126 MMHG | RESPIRATION RATE: 11 BRPM | HEART RATE: 102 BPM | OXYGEN SATURATION: 97 % | HEIGHT: 69 IN

## 2019-10-07 DIAGNOSIS — R11.0 NAUSEA: Primary | ICD-10-CM

## 2019-10-07 DIAGNOSIS — R10.13 ABDOMINAL PAIN, EPIGASTRIC: ICD-10-CM

## 2019-10-07 DIAGNOSIS — R10.13 EPIGASTRIC PAIN: ICD-10-CM

## 2019-10-07 PROCEDURE — 99214 OFFICE O/P EST MOD 30 MIN: CPT | Performed by: PHYSICIAN ASSISTANT

## 2019-10-07 RX ORDER — SUCRALFATE 1 G/1
1 TABLET ORAL 4 TIMES DAILY PRN
Qty: 40 TABLET | Refills: 1 | Status: SHIPPED | OUTPATIENT
Start: 2019-10-07 | End: 2022-03-21

## 2019-10-07 ASSESSMENT — MIFFLIN-ST. JEOR: SCORE: 1999.18

## 2019-10-07 NOTE — PROGRESS NOTES
Subjective     Ramila Kaye is a 24 year old female who presents to clinic today for the following health issues:    HPI   Concern - Nausea, abdominal pressure  Onset: Off and on since 9/28/2019    Description:   Nausea, fatigue, sweats and chills but has not had any fevers.  Stomach ache she states that it is hard for her to explain what has been going on with her stomach.    Intensity: moderate    Progression of Symptoms:  Same, just not getting any better    Accompanying Signs & Symptoms:  Fatigue, felt like she was starting to get the flu. Decreased appetite, has been eating crackers and drinking plenty fluids    Previous history of similar problem:   None    Precipitating factors:   Worsened by: None    Alleviating factors:  Improved by: None    Therapies Tried and outcome: Increased fluid intake, appetite has been decreased  -------------------------------------    Patient is here with complaint of recurrent epigastric pain that started on about Sept 28. She says that initially she would felt better if she ate but in the last few days she has been having symptoms with or without eating. She does have a history of a presumed gastric ulcer and has cut acid out of her diet and started omeprazole which she has continued daily since August 13th .She also has a history of gallbladder removal in 2017. She is not sexually active so no possibility of pregnancy. She does not use alcohol regularly and if she drinks has maybe one drink every few months and has not drank anything recently. She reports no diarrhea or constipation, no black or bloody stools.     BP Readings from Last 3 Encounters:   10/07/19 126/79   08/13/19 115/80   01/10/19 (P) 112/64    Wt Readings from Last 3 Encounters:   10/07/19 118.5 kg (261 lb 3.2 oz)   08/13/19 120.2 kg (265 lb)   01/10/19 118.8 kg (262 lb)                    -------------------------------------  Reviewed and updated as needed this visit by Provider  Tobacco  Allergies  Meds  " Problems  Med Hx  Surg Hx  Fam Hx  Soc Hx          Review of Systems   ROS COMP: Constitutional, HEENT, cardiovascular, pulmonary, GI, , musculoskeletal, neuro, skin, endocrine and psych systems are negative, except as otherwise noted.      Objective    /79   Pulse 102   Temp 99.9  F (37.7  C) (Tympanic)   Resp 11   Ht 1.753 m (5' 9\")   Wt 118.5 kg (261 lb 3.2 oz)   LMP 10/07/2019 (Exact Date)   SpO2 97%   BMI 38.57 kg/m    Body mass index is 38.57 kg/m .  Physical Exam   GENERAL: alert and no distress  EYES: Eyes grossly normal to inspection  HENT: ear canals and TM's normal, nose and mouth without ulcers or lesions  NECK: no adenopathy, no asymmetry, masses, or scars and thyroid normal to palpation  RESP: lungs clear to auscultation - no rales, rhonchi or wheezes  CV: regular rate and rhythm, normal S1 S2, no S3 or S4, no murmur, click or rub, no peripheral edema and peripheral pulses strong  ABDOMEN: tenderness epigastric and bowel sounds normal  MS: no gross musculoskeletal defects noted, no edema  SKIN: no suspicious lesions or rashes  BACK: no CVA tenderness, no paralumbar tenderness    Diagnostic Test Results:  none         Assessment & Plan       ICD-10-CM    1. Nausea R11.0 sucralfate (CARAFATE) 1 GM tablet     GASTROENTEROLOGY ADULT REF PROCEDURE ONLY [facility] [phone]; No Provider Preference     CANCELED: HCG qualitative urine - CSC and Range   2. Abdominal pain, epigastric R10.13 sucralfate (CARAFATE) 1 GM tablet     GASTROENTEROLOGY ADULT REF PROCEDURE ONLY [facility] [phone]; No Provider Preference   3. Epigastric pain R10.13 sucralfate (CARAFATE) 1 GM tablet     omeprazole (PRILOSEC) 20 MG DR capsule     GASTROENTEROLOGY ADULT REF PROCEDURE ONLY [facility] [phone]; No Provider Preference        BMI:   Estimated body mass index is 38.57 kg/m  as calculated from the following:    Height as of this encounter: 1.753 m (5' 9\").    Weight as of this " encounter: 118.5 kg (261 lb 3.2 oz).   Weight management plan: Patient was referred to their PCP to discuss a diet and exercise plan.      I will have patient increase her omeprazole and add Carafate and also will follow up with recurrent epigastric pain and reflux symptoms with an EGD. Follow up in clinic if symptoms are persisting or worsening  See Patient Instructions    No follow-ups on file.    Maria Holliday PA-C  [facility]

## 2019-10-07 NOTE — PATIENT INSTRUCTIONS
Patient Education     Epigastric Pain (Uncertain Cause)  Epigastric pain is pain in the upper abdomen. It can be a sign of disease. Common causes include:    Acid reflux (stomach acid flowing up into the esophagus)    Gastritis (irritation of the stomach lining) Most often this is from aspirin or NSAID medicines such as ibuprofen, bacteria called H. pylori, or frequent alcohol use.    Peptic ulcer disease    Inflammation of the pancreas    Gallstone    Infection in the gallbladder  Pain may be dull or burning. It may spread upward to the chest or to the back. There may be other symptoms such as belching, bloating, cramps or hunger pains. There may be weight loss or poor appetite, nausea or vomiting.  Since the cause of your pain is not certain yet,you may need more tests. Sometimes the doctor will treat you for the most likely condition to see if there is improvement before doing more tests.  Home care  Medicines    Antacids help neutralize the normal acids in your stomach.If you don t like the liquid, you can try a chewable one. You may find one works better than another for you. Overuse can cause diarrhea or constipation.    Acid blockers (H2 blockers) decrease acid production. Examples are cimetidine, famotidine, and ranitidine.    Acid inhibitors (PPIs) decrease acid production in a different way than the blockers. You may find they work better, but can take a little longer to take effect.  Examples are omeprazole, lansoprazole, pantoprazole, rabeprazole, and esomeprazole. Many of these are available over-the-counter or available as generics.    Take an antacid 30 to 60 minutes after eating and at bedtime, but not at the same time as an acid blocker.    Try not to take NSAIDs such as ibuprofen. Aspirin may also cause problems, but if taking it for your heart or other medical reasons, talk to your doctor before stopping it; you don't want to cause a worse problem, like a heart attack or stroke.  Diet    If  certain foods seem to cause your pain, try not to eat them. Certain foods can worsen symptoms of gastritis. Limit or avoid fatty, fried, and spicy foods, as well as coffee, chocolate, mint, and foods with high acid content such as tomatoes and citrus fruit and juices (orange, grapefruit, lemon).    Eat slowly and chew food well before swallowing. Symptoms of gastritis can be worsened by certain foods.    Don't drink alcohol. It can irritate the stomach.    Don't consume caffeine, or use tobacco. These can delay healing and worsen your problem.    Try eating smaller meals with snacks in between.    Keep an empty stomach for 2 to 3 hours before lying down.    Prop the head of the bed up if you have overnight symptoms. This helps acid clear from your esophagus.  Follow-up care  Follow up with your healthcare provider or as advised.  When to seek medical advice  Call your healthcare provider right away if any of the following occur:    Stomach pain worsens or moves to the right lower part of the abdomen    Chest pain appears, or if it worsens or spreads to the chest, back, neck, shoulder, or arm    Frequent vomiting (can t keep down liquids)    Blood in the stool or vomit (red or black color)    Feeling weak or dizzy, fainting, or having trouble breathing    Fever of 100.4 F (38 C) or higher, or as directed by your healthcare provider    Abdominal swelling  Date Last Reviewed: 3/1/2018    [id number] [employer]. [address] [place] [address], [place] [address]. All rights reserved. This information is not intended as a substitute for professional medical care. Always follow your healthcare professional's instructions.

## 2019-10-08 ENCOUNTER — HOSPITAL ENCOUNTER (OUTPATIENT)
Facility: CLINIC | Age: 24
End: 2019-10-08
Attending: SURGERY | Admitting: SURGERY
Payer: COMMERCIAL

## 2019-10-31 ENCOUNTER — TELEPHONE (OUTPATIENT)
Dept: FAMILY MEDICINE | Facility: CLINIC | Age: 24
End: 2019-10-31

## 2019-10-31 NOTE — TELEPHONE ENCOUNTER
Panel Management Review      Patient has the following on her problem list: None      Composite cancer screening  Chart review shows that this patient is due/due soon for the following Pap Smear  Summary:    Patient is due/failing the following:   PAP and PHYSICAL    Action needed:   Patient needs office visit for physcial.    Type of outreach:    Sent FIGS message.    Questions for provider review:    None                                                                                                                                    Manda Topete CMA ([facility])       Chart routed to Care Team .

## 2019-11-10 ENCOUNTER — ANESTHESIA EVENT (OUTPATIENT)
Dept: SURGERY | Facility: CLINIC | Age: 24
End: 2019-11-10

## 2019-11-10 NOTE — ANESTHESIA PREPROCEDURE EVALUATION
Anesthesia Pre-Procedure Evaluation    Patient: Ramila Kaye   MRN: [id number] : 1995          Preoperative Diagnosis: * No pre-op diagnosis entered *    Procedure(s):  ESOPHAGOGASTRODUODENOSCOPY (EGD)    Past Medical History:   Diagnosis Date     NO ACTIVE PROBLEMS      Past Surgical History:   Procedure Laterality Date     LAPAROSCOPIC CHOLECYSTECTOMY N/A 10/25/2017    Procedure: LAPAROSCOPIC CHOLECYSTECTOMY;  Laparoscopic Cholecystectomy;  Surgeon: Frank Servin MD;  Location: [facility] OR     NO HISTORY OF SURGERY         Anesthesia Evaluation     .             ROS/MED HX    ENT/Pulmonary:     (+)DAKOTA risk factors obese, , . .    Neurologic:       Cardiovascular:         METS/Exercise Tolerance:     Hematologic:         Musculoskeletal:         GI/Hepatic:     (+) Other GI/Hepatic epigastric pain, nausea, abdominal pressure      Renal/Genitourinary:         Endo:     (+) Obesity, .      Psychiatric:         Infectious Disease:         Malignancy:         Other:                                 Lab Results   Component Value Date    WBC 8.7 2019    HGB 13.8 2019    HCT 42.8 2019     2019     2019    POTASSIUM 4.0 2019    CHLORIDE 111 (H) 2019    CO2 23 2019    BUN 7 2019    CR 0.71 2019    GLC 77 2019    JERRY 8.9 2019    ALBUMIN 4.3 2019    PROTTOTAL 7.8 2019    ALT 32 2019    AST 21 2019    ALKPHOS 65 2019    BILITOTAL 1.0 2019    LIPASE 169 10/11/2017    HCG Negative 10/25/2017       Preop Vitals  BP Readings from Last 3 Encounters:   10/07/19 126/79   19 115/80   01/10/19 (P) 112/64    Pulse Readings from Last 3 Encounters:   10/07/19 102   19 100   19 89      Resp Readings from Last 3 Encounters:   10/07/19 11   19 15   10/12/18 16    SpO2 Readings from Last 3 Encounters:   10/07/19 97%   19 96%   19 98%      Temp Readings from Last 1 Encounters:  "  10/07/19 37.7  C (99.9  F) (Tympanic)    Ht Readings from Last 1 Encounters:   10/07/19 1.753 m (5' 9\")      Wt Readings from Last 1 Encounters:   10/07/19 118.5 kg (261 lb 3.2 oz)    Estimated body mass index is 38.57 kg/m  as calculated from the following:    Height as of 10/7/19: 1.753 m (5' 9\").    Weight as of 10/7/19: 118.5 kg (261 lb 3.2 oz).                   Deb Oilver, RUI CRNA  "

## 2019-11-12 NOTE — TELEPHONE ENCOUNTER
Panel Management Review  Summary:    Type of outreach:    None needed- patient has read her [employer] message    Encounter routed to No Action Needed.                                                                                                                                 Kalyn Suresh MA

## 2019-11-15 ENCOUNTER — ANESTHESIA (OUTPATIENT)
Dept: SURGERY | Facility: CLINIC | Age: 24
End: 2019-11-15

## 2019-11-26 ENCOUNTER — ANESTHESIA EVENT (OUTPATIENT)
Dept: GASTROENTEROLOGY | Facility: CLINIC | Age: 24
End: 2019-11-26
Payer: COMMERCIAL

## 2019-11-26 NOTE — ANESTHESIA PREPROCEDURE EVALUATION
Anesthesia Pre-Procedure Evaluation    Patient: Ramila Kaye   MRN: [id number] : 1995          Preoperative Diagnosis: Nausea [R11.0]  Abdominal pain, epigastric [R10.13]  Epigastric pain [R10.13]    Procedure(s):  ESOPHAGOGASTRODUODENOSCOPY (EGD)    Past Medical History:   Diagnosis Date     NO ACTIVE PROBLEMS      Past Surgical History:   Procedure Laterality Date     LAPAROSCOPIC CHOLECYSTECTOMY N/A 10/25/2017    Procedure: LAPAROSCOPIC CHOLECYSTECTOMY;  Laparoscopic Cholecystectomy;  Surgeon: Frank Servin MD;  Location: [facility] OR     NO HISTORY OF SURGERY         Anesthesia Evaluation     . Pt has had prior anesthetic. Type: General    No history of anesthetic complications          ROS/MED HX    ENT/Pulmonary:     (+)DAKOTA risk factors obese, , . .    Neurologic:  - neg neurologic ROS     Cardiovascular:     (+) Dyslipidemia, ----. : . . . :. .       METS/Exercise Tolerance:     Hematologic:  - neg hematologic  ROS       Musculoskeletal:  - neg musculoskeletal ROS       GI/Hepatic: Comment: Nausea, abdominal and epigastric pain        Renal/Genitourinary:  - ROS Renal section negative       Endo:     (+) Obesity, .      Psychiatric:  - neg psychiatric ROS       Infectious Disease:  - neg infectious disease ROS       Malignancy:      - no malignancy   Other:                          Physical Exam  Normal systems: cardiovascular, pulmonary and dental    Airway   Mallampati: II  TM distance: >3 FB  Neck ROM: full    Dental     Cardiovascular       Pulmonary             Lab Results   Component Value Date    WBC 8.7 2019    HGB 13.8 2019    HCT 42.8 2019     2019     2019    POTASSIUM 4.0 2019    CHLORIDE 111 (H) 2019    CO2 23 2019    BUN 7 2019    CR 0.71 2019    GLC 77 2019    JERRY 8.9 2019    ALBUMIN 4.3 2019    PROTTOTAL 7.8 2019    ALT 32 2019    AST 21 2019    ALKPHOS 65 2019     "BILITOTAL 1.0 08/13/2019    LIPASE 169 10/11/2017    HCG Negative 10/25/2017       Preop Vitals  BP Readings from Last 3 Encounters:   10/07/19 126/79   08/13/19 115/80   01/10/19 (P) 112/64    Pulse Readings from Last 3 Encounters:   10/07/19 102   08/13/19 100   01/08/19 89      Resp Readings from Last 3 Encounters:   10/07/19 11   01/08/19 15   10/12/18 16    SpO2 Readings from Last 3 Encounters:   10/07/19 97%   08/13/19 96%   01/08/19 98%      Temp Readings from Last 1 Encounters:   10/07/19 37.7  C (99.9  F) (Tympanic)    Ht Readings from Last 1 Encounters:   10/07/19 1.753 m (5' 9\")      Wt Readings from Last 1 Encounters:   10/07/19 118.5 kg (261 lb 3.2 oz)    Estimated body mass index is 38.57 kg/m  as calculated from the following:    Height as of 10/7/19: 1.753 m (5' 9\").    Weight as of 10/7/19: 118.5 kg (261 lb 3.2 oz).       Anesthesia Plan      History & Physical Review  History and physical reviewed and following examination; no interval change.    ASA Status:  2 .    NPO Status:  > 8 hours    Plan for MAC Reason for MAC:  Deep or markedly invasive procedure (G8)         Postoperative Care      Consents  Anesthetic plan, risks, benefits and alternatives discussed with:  Patient..                 RUI Ng CRNA  "

## 2019-11-29 ENCOUNTER — ANESTHESIA (OUTPATIENT)
Dept: GASTROENTEROLOGY | Facility: CLINIC | Age: 24
End: 2019-11-29
Payer: COMMERCIAL

## 2019-11-29 ENCOUNTER — HOSPITAL ENCOUNTER (OUTPATIENT)
Facility: CLINIC | Age: 24
Discharge: HOME | End: 2019-11-29
Attending: SURGERY | Admitting: SURGERY
Payer: COMMERCIAL

## 2019-11-29 VITALS
TEMPERATURE: 98.7 F | HEART RATE: 82 BPM | OXYGEN SATURATION: 96 % | DIASTOLIC BLOOD PRESSURE: 90 MMHG | BODY MASS INDEX: 37.77 KG/M2 | SYSTOLIC BLOOD PRESSURE: 129 MMHG | HEIGHT: 69 IN | WEIGHT: 255 LBS | RESPIRATION RATE: 16 BRPM

## 2019-11-29 LAB
HCG UR QL: NEGATIVE
UPPER GI ENDOSCOPY: NORMAL

## 2019-11-29 PROCEDURE — 43239 EGD BIOPSY SINGLE/MULTIPLE: CPT | Performed by: SURGERY

## 2019-11-29 PROCEDURE — 25000125 ZZHC RX 250: Performed by: SURGERY

## 2019-11-29 PROCEDURE — 37000008 ZZH ANESTHESIA TECHNICAL FEE, 1ST 30 MIN: Performed by: SURGERY

## 2019-11-29 PROCEDURE — 88305 TISSUE EXAM BY PATHOLOGIST: CPT | Performed by: SURGERY

## 2019-11-29 PROCEDURE — 25800030 ZZH RX IP 258 OP 636: Performed by: SURGERY

## 2019-11-29 PROCEDURE — 25000125 ZZHC RX 250: Performed by: NURSE ANESTHETIST, CERTIFIED REGISTERED

## 2019-11-29 PROCEDURE — 25000128 H RX IP 250 OP 636: Performed by: NURSE ANESTHETIST, CERTIFIED REGISTERED

## 2019-11-29 PROCEDURE — 88305 TISSUE EXAM BY PATHOLOGIST: CPT | Mod: 26,59 | Performed by: SURGERY

## 2019-11-29 PROCEDURE — 88342 IMHCHEM/IMCYTCHM 1ST ANTB: CPT | Performed by: SURGERY

## 2019-11-29 PROCEDURE — 81025 URINE PREGNANCY TEST: CPT | Performed by: NURSE ANESTHETIST, CERTIFIED REGISTERED

## 2019-11-29 PROCEDURE — 88342 IMHCHEM/IMCYTCHM 1ST ANTB: CPT | Mod: 26 | Performed by: SURGERY

## 2019-11-29 RX ORDER — FLUMAZENIL 0.1 MG/ML
0.2 INJECTION, SOLUTION INTRAVENOUS
Status: CANCELLED | OUTPATIENT
Start: 2019-11-29 | End: 2019-11-29

## 2019-11-29 RX ORDER — ONDANSETRON 2 MG/ML
4 INJECTION INTRAMUSCULAR; INTRAVENOUS
Status: DISCONTINUED | OUTPATIENT
Start: 2019-11-29 | End: 2019-11-29 | Stop reason: HOSPADM

## 2019-11-29 RX ORDER — NALOXONE HYDROCHLORIDE 0.4 MG/ML
.1-.4 INJECTION, SOLUTION INTRAMUSCULAR; INTRAVENOUS; SUBCUTANEOUS
Status: CANCELLED | OUTPATIENT
Start: 2019-11-29 | End: 2019-11-30

## 2019-11-29 RX ORDER — SODIUM CHLORIDE, SODIUM LACTATE, POTASSIUM CHLORIDE, CALCIUM CHLORIDE 600; 310; 30; 20 MG/100ML; MG/100ML; MG/100ML; MG/100ML
INJECTION, SOLUTION INTRAVENOUS CONTINUOUS
Status: DISCONTINUED | OUTPATIENT
Start: 2019-11-29 | End: 2019-11-29 | Stop reason: HOSPADM

## 2019-11-29 RX ORDER — LIDOCAINE HYDROCHLORIDE 10 MG/ML
INJECTION, SOLUTION INFILTRATION; PERINEURAL PRN
Status: DISCONTINUED | OUTPATIENT
Start: 2019-11-29 | End: 2019-11-29

## 2019-11-29 RX ORDER — GLYCOPYRROLATE 0.2 MG/ML
INJECTION, SOLUTION INTRAMUSCULAR; INTRAVENOUS PRN
Status: DISCONTINUED | OUTPATIENT
Start: 2019-11-29 | End: 2019-11-29

## 2019-11-29 RX ORDER — PROPOFOL 10 MG/ML
INJECTION, EMULSION INTRAVENOUS PRN
Status: DISCONTINUED | OUTPATIENT
Start: 2019-11-29 | End: 2019-11-29

## 2019-11-29 RX ORDER — LIDOCAINE 40 MG/G
CREAM TOPICAL
Status: DISCONTINUED | OUTPATIENT
Start: 2019-11-29 | End: 2019-11-29 | Stop reason: HOSPADM

## 2019-11-29 RX ADMIN — LIDOCAINE HYDROCHLORIDE 0.1 ML: 10 INJECTION, SOLUTION EPIDURAL; INFILTRATION; INTRACAUDAL; PERINEURAL at 09:07

## 2019-11-29 RX ADMIN — PROPOFOL 100 MG: 10 INJECTION, EMULSION INTRAVENOUS at 09:16

## 2019-11-29 RX ADMIN — SODIUM CHLORIDE, POTASSIUM CHLORIDE, SODIUM LACTATE AND CALCIUM CHLORIDE: 600; 310; 30; 20 INJECTION, SOLUTION INTRAVENOUS at 09:07

## 2019-11-29 RX ADMIN — PROPOFOL 50 MG: 10 INJECTION, EMULSION INTRAVENOUS at 09:21

## 2019-11-29 RX ADMIN — PROPOFOL 50 MG: 10 INJECTION, EMULSION INTRAVENOUS at 09:17

## 2019-11-29 RX ADMIN — LIDOCAINE HYDROCHLORIDE 100 MG: 10 INJECTION, SOLUTION INFILTRATION; PERINEURAL at 09:16

## 2019-11-29 RX ADMIN — GLYCOPYRROLATE 0.2 MG: 0.2 INJECTION, SOLUTION INTRAMUSCULAR; INTRAVENOUS at 09:12

## 2019-11-29 ASSESSMENT — MIFFLIN-ST. JEOR: SCORE: 1971.05

## 2019-11-29 NOTE — ANESTHESIA POSTPROCEDURE EVALUATION
Patient: Ramila Kaye    Procedure(s):  ESOPHAGOGASTRODUODENOSCOPY, WITH BIOPSY    Diagnosis:Nausea [R11.0]  Abdominal pain, epigastric [R10.13]  Epigastric pain [R10.13]  Diagnosis Additional Information: No value filed.    Anesthesia Type:  MAC    Note:  Anesthesia Post Evaluation    Patient location during evaluation: Phase 2 and Bedside  Patient participation: Able to fully participate in evaluation  Level of consciousness: awake and alert  Pain management: adequate  Airway patency: patent  Cardiovascular status: acceptable  Respiratory status: acceptable  Hydration status: acceptable  PONV: none     Anesthetic complications: None          Last vitals:  Vitals:    11/29/19 0815 11/29/19 0930   BP: (!) 144/79 104/62   Pulse: 95 86   Resp: 18    Temp: 37.1  C (98.7  F)    SpO2: 99% 96%         Electronically Signed By: RUI Britton CRNA  November 29, 2019  9:56 AM

## 2019-11-29 NOTE — ANESTHESIA CARE TRANSFER NOTE
Patient: Ramila Kaye    Procedure(s):  ESOPHAGOGASTRODUODENOSCOPY, WITH BIOPSY    Diagnosis: Nausea [R11.0]  Abdominal pain, epigastric [R10.13]  Epigastric pain [R10.13]  Diagnosis Additional Information: No value filed.    Anesthesia Type:   MAC     Note:  Airway :Room Air  Patient transferred to:Phase II  Comments: Patient's VSS. Spontaneous respirations. Patient awake and oriented. IV patent. Report to RN.Handoff Report: Identifed the Patient, Identified the Reponsible Provider, Reviewed the pertinent medical history, Discussed the surgical course, Reviewed Intra-OP anesthesia mangement and issues during anesthesia, Set expectations for post-procedure period and Allowed opportunity for questions and acknowledgement of understanding      Vitals: (Last set prior to Anesthesia Care Transfer)    CRNA VITALS  11/29/2019 0858 - 11/29/2019 0928      11/29/2019             Pulse:  74    SpO2:  98 %                Electronically Signed By: RUI Britton CRNA  November 29, 2019  9:28 AM

## 2019-12-04 LAB — COPATH REPORT: NORMAL

## 2020-03-01 ENCOUNTER — HEALTH MAINTENANCE LETTER (OUTPATIENT)
Age: 25
End: 2020-03-01

## 2021-04-18 ENCOUNTER — HEALTH MAINTENANCE LETTER (OUTPATIENT)
Age: 26
End: 2021-04-18

## 2021-10-27 NOTE — ED PROVIDER NOTES
History     Chief Complaint   Patient presents with     Hand Pain     fingers hyperextended, injury occured approx 1330     HPI  Ramila Kaye is a 22 year old female who presents with complaints of left hand injury.  Pt states she was messing around with a co-worker today, and she is unsure exactly what happened, but her hand got caught and her left small and ring fingers were hyperextended backwards.  She reports immediate numbness and tingling in these fingers and now has pain and difficulties fully extending them.  Pt is right-hand dominant.      Problem List:    Patient Active Problem List    Diagnosis Date Noted     Immunization not carried out - Declines HPV vaccine 10/11/2017     Priority: Medium     10/11/2017- Declines HPV vaccine       CARDIOVASCULAR SCREENING; LDL GOAL LESS THAN 160 09/06/2016     Priority: Medium        Past Medical History:    Past Medical History:   Diagnosis Date     NO ACTIVE PROBLEMS        Past Surgical History:    Past Surgical History:   Procedure Laterality Date     LAPAROSCOPIC CHOLECYSTECTOMY N/A 10/25/2017    Procedure: LAPAROSCOPIC CHOLECYSTECTOMY;  Laparoscopic Cholecystectomy;  Surgeon: Frank Servin MD;  Location: [facility]     NO HISTORY OF SURGERY         Family History:    Family History   Problem Relation Age of Onset     Coronary Artery Disease Maternal Grandmother      Glaucoma Paternal Grandmother      GERD Brother        Social History:  Marital Status:  Single [1]  Social History   Substance Use Topics     Smoking status: Never Smoker     Smokeless tobacco: Never Used     Alcohol use Yes        Medications:      HYDROcodone-acetaminophen (NORCO) 5-325 MG per tablet   ondansetron (ZOFRAN-ODT) 4 MG ODT tab         Review of Systems   Constitutional: Negative.    Musculoskeletal:        Left hand injury   Skin: Negative.    Neurological: Negative.    All other systems reviewed and are negative.      Physical Exam   BP: 135/76  Pulse: 100  Temp: 98.8  F  Santos Ospina is calling she is to have labs done on Friday with [facility] and she would like to know if she can get an overall blood check to make sure everything is ok please call "(37.1  C)  Resp: 16  Height: 175.3 cm (5' 9\")  Weight: 102.1 kg (225 lb)  SpO2: 99 %      Physical Exam   Constitutional: She appears well-developed and well-nourished. No distress.   HENT:   Head: Normocephalic and atraumatic.   Cardiovascular: Intact distal pulses.    Musculoskeletal:        Left wrist: Normal. She exhibits normal range of motion, no tenderness, no bony tenderness and no swelling.        Left hand: She exhibits decreased range of motion, tenderness and bony tenderness. She exhibits normal capillary refill, no deformity, no laceration and no swelling. Normal sensation noted. Normal strength noted.   Diffuse tenderness along left small finger.  Patient is unable to actively extend her left distal small finger at the DIP joint.  Mild tenderness to left ring finger.  No overlying skin changes.  Tenderness does not extend into the hand or wrist.   Neurological: She is alert. She has normal strength. No sensory deficit.   Skin: Skin is warm and dry.       ED Course     ED Course     Procedures      Results for orders placed or performed during the hospital encounter of 03/13/18   Hand XR, G/E 3 views, left    Narrative    HAND LEFT THREE OR MORE VIEWS  3/13/2018 7:07 PM     COMPARISON: None    HISTORY: Hyperextension injury of small and ring fingers.    FINDINGS: The visualized bones and joint spaces are within normal  limits.      Impression    IMPRESSION: No evidence for fracture, dislocation or significant  degenerative change of the left hand.       - Patient was evaluated on arrival at 1850  - History was obtained from patient  - X-rays of left hand were negative for fracture or acute pathology.  - Patient was re-evaluated.    - Distal left small finger was splinted with a \"stack splint\" given her inability to extend her distal left small finger at the DIP joint.  - Discussed results with patient.      Assessments & Plan (with Medical Decision Making)     Pt is a 22 year old female who presents " "with complaints of left hand injury.  Pt states she was messing around with a co-worker today, and she is unsure exactly what happened, but her hand got caught and her left small and ring fingers were hyperextended backwards.  She reports immediate numbness and tingling in these fingers and now has pain and difficulties fully extending them.  Pt is right-hand dominant.  Pt is afebrile on arrival.  Exam as above.  X-rays of left hand were negative for fracture or acute pathology.  Distal left small finger was splinted with a \"stack splint\" given her inability to extend her distal left small finger at the DIP joint.  Discussed results with patient.       Plan:  - Patient was discharged home.  - Tylenol or Ibuprofen may be taken as needed for pain or fevers.  - Follow-up with orthopedics in 5-7 days for continued care and management.  - Return to the ED for persistent and/or worsening symptoms.  We discussed reasons to return to ED.   - Patient expressed understanding of the diagnosis and plan and was discharged home in stable condition.  - Hand-outs were provided.    I have reviewed the nursing notes.    I have reviewed the findings, diagnosis, plan and need for follow up with the patient.    Discharge Medication List as of 3/13/2018  7:31 PM          Final diagnoses:   Finger injury, left, initial encounter   Mallet finger of left hand       3/13/2018   [facility] EMERGENCY DEPARTMENT     Patti Laureano PA-C  03/13/18 2027    "

## 2022-03-21 ENCOUNTER — OFFICE VISIT (OUTPATIENT)
Dept: FAMILY MEDICINE | Facility: CLINIC | Age: 27
End: 2022-03-21
Payer: COMMERCIAL

## 2022-03-21 VITALS
HEART RATE: 92 BPM | WEIGHT: 231 LBS | SYSTOLIC BLOOD PRESSURE: 104 MMHG | BODY MASS INDEX: 34.11 KG/M2 | DIASTOLIC BLOOD PRESSURE: 62 MMHG

## 2022-03-21 DIAGNOSIS — M25.562 ACUTE PAIN OF LEFT KNEE: Primary | ICD-10-CM

## 2022-03-21 DIAGNOSIS — M22.2X2 PATELLOFEMORAL PAIN SYNDROME OF LEFT KNEE: ICD-10-CM

## 2022-03-21 PROCEDURE — 99213 OFFICE O/P EST LOW 20 MIN: CPT | Performed by: FAMILY MEDICINE

## 2022-03-21 NOTE — PROGRESS NOTES
Assessment/plan   Ramila Kaye is a 26 year old female who is New  patient to my practice here with chief complaint of intermittent, worsening left knee pain for the last 10 days.    Patient presents with:  Knee Pain: left knee x1 week or more - no injury        Ramila was seen today for knee pain.    Diagnoses and all orders for this visit:    Acute pain of left knee  Comments:  Meniscal vs patellofemoral syndrome. Avoid sharp movements such as HIT. NSAIDs + excercise material provided. Use patellar brace instead of whole knee brace.    Patellofemoral pain syndrome of left knee  Comments:  can't r/o meniscus injury Excercise material provided. Avoid HIT workout until improvement of pain. NSAIDs and continue icing. Defer imaging for now.          Subjective:      HPI: Ramila Kaye is a 26 year old female is here for left knee pain for 10 days. The patient denies trauma or extended kneeling. Pain radiates down the medial side of her left calf. Knee extension is weak. Never had a problem like this before.    Denies chest pain/palpitaions, SOB, wheezing, headache, vision changes, cough, sore throat, rashes, recent travel, constipation, diarrhea, bloody stool or urine, regular periods, dysuria, unusual discharge, fever, chills, numbness or tingling anywhere.    Pain History:  When did you first notice your pain? - 1 to 6 weeks (10 days)  Have you seen any provider previously for this issue? No  How has your pain affected your ability to work? Can work full time with limitations   What type of work do you or did you do?  at [employer]  Where in your body do you have pain? Musculoskeletal problem/pain  Onset/Duration: 10 days  Description  Location: knee - left  Joint Swelling: no  Redness: no  Pain: YES  Warmth: no  Intensity:  moderate, 1-6/10  Progression of Symptoms:  worsening and intermittent  Accompanying signs and symptoms:   Fevers: no  Numbness/tingling/weakness: YES- sometimes weakness in the  left knee  History  Trauma to the area: no  Recent illness:  no  Previous similar problem: no  Previous evaluation:  no  Precipitating or alleviating factors: icing/straightening the leg (stretching)  Aggravating factors include: walking, overuse and putting weight on the left knee  Therapies tried and outcome: rest/inactivity, ice, stretching and Ibuprofen    OBESITY: working on diet and exercise lost 20 ib since last 2 y    Wt Readings from Last 3 Encounters:   03/21/22 104.8 kg (231 lb)   11/29/19 115.7 kg (255 lb)   10/07/19 118.5 kg (261 lb 3.2 oz)       Answers for HPI/ROS submitted by the patient on 3/20/2022  How many servings of fruits and vegetables do you eat daily?: 2-3  On average, how many sweetened beverages do you drink each day (Examples: soda, juice, sweet tea, etc.  Do NOT count diet or artificially sweetened beverages)?: 1  How many minutes a day do you exercise enough to make your heart beat faster?: 30 to 60  How many days a week do you exercise enough to make your heart beat faster?: 4  How many days per week do you miss taking your medication?: 0  What is the reason for your visit today?: Knee pain  When did your symptoms begin?: 1-2 weeks ago  What are your symptoms?: Pain around knee cap, shooting pain down thr side of leg (mainly inner calf)  How would you describe these symptoms?: Moderate  Are your symptoms:: Worsening  Have you had these symptoms before?: No  Is there anything that makes you feel worse?: Walking/kneeling  Is there anything that makes you feel better?: Ice        I have personally reviewed the patient's allergies, medications, past medical history, family history, social history, rooming notes and problem list in detail and updated the patient record as necessary.      Past Medical History:   Diagnosis Date     NO ACTIVE PROBLEMS      Past Surgical History:   Procedure Laterality Date     ESOPHAGOSCOPY, GASTROSCOPY, DUODENOSCOPY (EGD), COMBINED N/A 11/29/2019     Procedure: ESOPHAGOGASTRODUODENOSCOPY, WITH BIOPSY;  Surgeon: Fernando Kenny DO;  Location: [facility]     LAPAROSCOPIC CHOLECYSTECTOMY N/A 10/25/2017    Procedure: LAPAROSCOPIC CHOLECYSTECTOMY;  Laparoscopic Cholecystectomy;  Surgeon: Frank Servin MD;  Location: [facility]     NO HISTORY OF SURGERY       Nka [no known allergies]  No current outpatient medications on file.     Family History   Problem Relation Age of Onset     Coronary Artery Disease Maternal Grandmother      Glaucoma Paternal Grandmother      GERD Brother        Patient Active Problem List   Diagnosis     CARDIOVASCULAR SCREENING; LDL GOAL LESS THAN 160     Immunization not carried out - Declines HPV vaccine       Review of Systems    12 point comprehensive review of systems was negative except as noted and HPI     Social History     Social History Narrative     Not on file       Objective:     Vitals:    03/21/22 1035   BP: 104/62   Pulse: 92   Weight: 104.8 kg (231 lb)       Physical Exam:   Physical Exam:  General Appearance:  Appears comfortable, Alert, cooperative, no distress,   Head: Normocephalic, without obvious abnormality, atraumatic  Eyes: PERRL, conjunctiva/corneas clear, EOM's intact, both eyes             Nose: Nares normal, no drainage   Throat: Lips, mucosa, and tongue normal; teeth and gums normal  Neck: Supple, symmetrical, trachea midline                 Lungs: Clear to auscultation bilaterally, respirations unlabored  Heart: Regular rate and rhythm, S1 and S2 normal, no murmur, rubs or gallop  Extremities: Left knee slightly swollen inferior and medial to patella, atraumatic, no cyanosis or edema, left knee extension weaker than the right and active flexion of the left knee induces pain  Pulses: DP pulses are 1-2+ bilat.    Skin: no rashes or lesions       25 minutes spent on the day of encounter doing chart review, history and exam, documentation, and further activities as noted.     This note has been dictated  using voice recognition software. Any grammatical or context distortions are unintentional and inherent to the software    Ketty Jacobson MD   Patient Instructions     Patient Education     Knee Pain  Knee pain is very common. It s especially common in active people who put a lot of pressure on their knees, like runners. It affects women more often than men.  Your kneecap (patella) is a thick, round bone. It covers and protects the front portion of your knee joint. It moves along a groove in your thighbone (femur) as part of the patellofemoral joint. A layer of cartilage surrounds the underside of your kneecap. This layer protects it from grinding against your femur.  When this cartilage softens and breaks down, it can cause knee pain. This is partly because of repetitive stress. The stress irritates the lining of the joint. This causes pain in the underlying bone.  What causes knee pain?  Many things can cause knee pain. You may have more than one cause. Some of these include:    Overuse of the knee joint    The kneecap doesn t line up with the tissue around it    Damage to small nerves in the area    Damage to the ligament-like structure that holds the kneecap in place (retinaculum)    Breakdown of the bone under the cartilage    Swelling in the soft tissues around the kneecap    Injury  You might be more likely to have knee pain if you:    Exercise a lot    Recently increased the intensity of your workouts    Have a body mass index (BMI) greater than 25    Have poor alignment of your kneecap    Walk with your feet turned overly outward or inward    Have weakness in surrounding muscle groups (inner quad or hip adductor muscles)    Have too much tightness in surrounding muscle groups (hamstrings or iliotibial band)    Have a recent history of injury to the area    Are female  Symptoms of knee pain  This type of knee pain is a dull, aching pain in the front of the knee in the area under and around the kneecap. This  pain may start quickly or slowly. Your pain might be worse when you squat, run, or sit for a long time. Climbing stairs may be painful or hard to do. You might also sometimes feel like your knee is giving out. You may have symptoms in one or both of your knees.  Diagnosing knee pain  Your healthcare provider will ask about your medical history and your symptoms. Be sure to describe any activities that make your knee pain worse. He or she will look at your knee. This will include tests of your range of motion, strength, and areas of pain of your knee. Your knee alignment will be checked.  Your healthcare provider will need to rule out other causes of your knee pain, such as arthritis. You may need an imaging test, such as an X-ray or MRI.  Treatment for knee pain  Treatments that can help ease your symptoms may include:    Avoiding activities for a while that make your pain worse, returning to activity over time    Icing the outside of your knee when it causes you pain    Taking over-the-counter pain medicine such as NSAIDs    Wearing a knee brace or taping your knee to support it    Compression to help prevent swelling    Wearing special shoe inserts to help keep your feet in the proper alignment    Elevating your knee    Doing special exercises to stretch and strengthen the muscles around your hip and your knee  These steps help most people manage knee pain. But some cases of knee pain need to be treated with surgery. You rarely need surgery right away. You may need it later if other treatments don t work. Your healthcare provider may refer you to an orthopedic surgeon. He or she will talk with you about your choices.  Preventing knee pain  Losing weight and correcting excess muscle tightness or muscle weakness may help lower your risk.  In some cases, you can prevent knee pain. To help prevent a flare-up of knee pain, do these things:    Regularly do all the exercises your doctor or physical therapist  advises    Warm up fully before exercising    Support your knee as advised by your doctor or physical therapist    Increase training gradually, and ease up on training when needed    Have an expert check your gait for running or other sporting activities    Stretch properly before and after exercise    Replace your running shoes regularly    Lose excess weight  When to call your healthcare provider  Call your healthcare provider right away if:    Your symptoms don t get better after a few weeks of treatment    You have any new symptoms  [employer] last reviewed this educational content on 6/1/2019 2000-2021 [employer]. All rights reserved. This information is not intended as a substitute for professional medical care. Always follow your healthcare professional's instructions.           Patient Education     Reducing Knee Pain and Swelling    Many treatments can help reduce pain and swelling in your knee. Your healthcare provider or physical therapist may suggest one or more of the following treatments:    Icing your knee.  This helps reduce swelling. You may be asked to ice your knee once a day or more. Apply ice for about 15 to 20 minutes at a time, with at least 40 minutes between sessions. To make an ice pack, put ice cubes in a plastic bag that seals at the top. Wrap the bag in a clean, thin towel or cloth. Never put ice or an ice pack directly on the skin.    Keeping your leg raised above your heart. This helps excess fluid flow out of your knee joint to reduce swelling.    Compression. This means wrapping an elastic bandage or neoprene sleeve snugly around your knees. It keeps fluid from collecting in and around your knee joint.    Electrical stimulation. This is done by a physical therapist or . It can help reduce excess fluid in your knee joint.    Anti-inflammatory medicines. These may be prescribed by your healthcare provider. You may take pills or get shots (injections) in  your knee.    Isometric (mikhail) exercises. These strengthen the muscles that support your knee joint. They also help reduce excess fluid in your knee.    Massage. This helps fluid drain away from your knee.  [employer] last reviewed this educational content on 5/1/2018 2000-2021 [employer]. All rights reserved. This information is not intended as a substitute for professional medical care. Always follow your healthcare professional's instructions.           Patient Education     Treating Meniscus Problems  The type of surgery you have depends on the nature of your tear, its size, and location. Your surgeon may use arthroscopy. This method involves putting a tiny camera inside your knee, so that your healthcare provider can clearly see your joint. Arthroscopy requires only small incisions (cuts). You can usually go home the same day as surgery. During surgery, you may have:    Local anesthesia. Your healthcare provider numbs your knee with medicine.     A regional block. Your body is numbed from the waist down.    General anesthesia. Your healthcare provider gives you drugs to put you into a deep sleep so you do not feel pain.  Pre-op checklist    Don't eat or drink 10 hours before surgery.    Arrange for someone to drive you home after surgery.    Tell your healthcare provider if you take any medicine, supplements, or herbal remedies.             Repair  For certain tears, your surgeon will try to repair the meniscus. He or she will sew the torn edges so they can heal properly. Or your surgeon will use special fasteners to repair damage. In some cases, repairs may require another incision at the back or side of your knee.    Removal  In most cases, your surgeon will remove the damaged part of your meniscus. The meniscus won't completely grow back, so your surgeon will remove as little tissue as possible. Other tissue, called the articular cartilage, will take over the role as shock absorber for  your knee joint.    After surgery  You'll spend some time in the recovery area. You can go home when you've recovered from the anesthesia. Your knee will be bandaged. You may have stitches, steri-strips, or staples. You may need crutches to keep weight off the knee and may have a splint for support.  [employer] last reviewed this educational content on 1/1/2018 2000-2021 [employer]. All rights reserved. This information is not intended as a substitute for professional medical care. Always follow your healthcare professional's instructions.

## 2022-03-21 NOTE — PATIENT INSTRUCTIONS
Patient Education     Knee Pain  Knee pain is very common. It s especially common in active people who put a lot of pressure on their knees, like runners. It affects women more often than men.  Your kneecap (patella) is a thick, round bone. It covers and protects the front portion of your knee joint. It moves along a groove in your thighbone (femur) as part of the patellofemoral joint. A layer of cartilage surrounds the underside of your kneecap. This layer protects it from grinding against your femur.  When this cartilage softens and breaks down, it can cause knee pain. This is partly because of repetitive stress. The stress irritates the lining of the joint. This causes pain in the underlying bone.  What causes knee pain?  Many things can cause knee pain. You may have more than one cause. Some of these include:    Overuse of the knee joint    The kneecap doesn t line up with the tissue around it    Damage to small nerves in the area    Damage to the ligament-like structure that holds the kneecap in place (retinaculum)    Breakdown of the bone under the cartilage    Swelling in the soft tissues around the kneecap    Injury  You might be more likely to have knee pain if you:    Exercise a lot    Recently increased the intensity of your workouts    Have a body mass index (BMI) greater than 25    Have poor alignment of your kneecap    Walk with your feet turned overly outward or inward    Have weakness in surrounding muscle groups (inner quad or hip adductor muscles)    Have too much tightness in surrounding muscle groups (hamstrings or iliotibial band)    Have a recent history of injury to the area    Are female  Symptoms of knee pain  This type of knee pain is a dull, aching pain in the front of the knee in the area under and around the kneecap. This pain may start quickly or slowly. Your pain might be worse when you squat, run, or sit for a long time. Climbing stairs may be painful or hard to do. You might also  sometimes feel like your knee is giving out. You may have symptoms in one or both of your knees.  Diagnosing knee pain  Your healthcare provider will ask about your medical history and your symptoms. Be sure to describe any activities that make your knee pain worse. He or she will look at your knee. This will include tests of your range of motion, strength, and areas of pain of your knee. Your knee alignment will be checked.  Your healthcare provider will need to rule out other causes of your knee pain, such as arthritis. You may need an imaging test, such as an X-ray or MRI.  Treatment for knee pain  Treatments that can help ease your symptoms may include:    Avoiding activities for a while that make your pain worse, returning to activity over time    Icing the outside of your knee when it causes you pain    Taking over-the-counter pain medicine such as NSAIDs    Wearing a knee brace or taping your knee to support it    Compression to help prevent swelling    Wearing special shoe inserts to help keep your feet in the proper alignment    Elevating your knee    Doing special exercises to stretch and strengthen the muscles around your hip and your knee  These steps help most people manage knee pain. But some cases of knee pain need to be treated with surgery. You rarely need surgery right away. You may need it later if other treatments don t work. Your healthcare provider may refer you to an orthopedic surgeon. He or she will talk with you about your choices.  Preventing knee pain  Losing weight and correcting excess muscle tightness or muscle weakness may help lower your risk.  In some cases, you can prevent knee pain. To help prevent a flare-up of knee pain, do these things:    Regularly do all the exercises your doctor or physical therapist advises    Warm up fully before exercising    Support your knee as advised by your doctor or physical therapist    Increase training gradually, and ease up on training when  needed    Have an expert check your gait for running or other sporting activities    Stretch properly before and after exercise    Replace your running shoes regularly    Lose excess weight  When to call your healthcare provider  Call your healthcare provider right away if:    Your symptoms don t get better after a few weeks of treatment    You have any new symptoms  Li last reviewed this educational content on 6/1/2019 2000-2021 [employer]. All rights reserved. This information is not intended as a substitute for professional medical care. Always follow your healthcare professional's instructions.           Patient Education     Reducing Knee Pain and Swelling    Many treatments can help reduce pain and swelling in your knee. Your healthcare provider or physical therapist may suggest one or more of the following treatments:    Icing your knee.  This helps reduce swelling. You may be asked to ice your knee once a day or more. Apply ice for about 15 to 20 minutes at a time, with at least 40 minutes between sessions. To make an ice pack, put ice cubes in a plastic bag that seals at the top. Wrap the bag in a clean, thin towel or cloth. Never put ice or an ice pack directly on the skin.    Keeping your leg raised above your heart. This helps excess fluid flow out of your knee joint to reduce swelling.    Compression. This means wrapping an elastic bandage or neoprene sleeve snugly around your knees. It keeps fluid from collecting in and around your knee joint.    Electrical stimulation. This is done by a physical therapist or . It can help reduce excess fluid in your knee joint.    Anti-inflammatory medicines. These may be prescribed by your healthcare provider. You may take pills or get shots (injections) in your knee.    Isometric (mikhail) exercises. These strengthen the muscles that support your knee joint. They also help reduce excess fluid in your  knee.    Massage. This helps fluid drain away from your knee.  [employer] last reviewed this educational content on 5/1/2018 2000-2021 [employer]. All rights reserved. This information is not intended as a substitute for professional medical care. Always follow your healthcare professional's instructions.           Patient Education     Treating Meniscus Problems  The type of surgery you have depends on the nature of your tear, its size, and location. Your surgeon may use arthroscopy. This method involves putting a tiny camera inside your knee, so that your healthcare provider can clearly see your joint. Arthroscopy requires only small incisions (cuts). You can usually go home the same day as surgery. During surgery, you may have:    Local anesthesia. Your healthcare provider numbs your knee with medicine.     A regional block. Your body is numbed from the waist down.    General anesthesia. Your healthcare provider gives you drugs to put you into a deep sleep so you do not feel pain.  Pre-op checklist    Don't eat or drink 10 hours before surgery.    Arrange for someone to drive you home after surgery.    Tell your healthcare provider if you take any medicine, supplements, or herbal remedies.             Repair  For certain tears, your surgeon will try to repair the meniscus. He or she will sew the torn edges so they can heal properly. Or your surgeon will use special fasteners to repair damage. In some cases, repairs may require another incision at the back or side of your knee.    Removal  In most cases, your surgeon will remove the damaged part of your meniscus. The meniscus won't completely grow back, so your surgeon will remove as little tissue as possible. Other tissue, called the articular cartilage, will take over the role as shock absorber for your knee joint.    After surgery  You'll spend some time in the recovery area. You can go home when you've recovered from the anesthesia. Your knee  will be bandaged. You may have stitches, steri-strips, or staples. You may need crutches to keep weight off the knee and may have a splint for support.  Li last reviewed this educational content on 1/1/2018 2000-2021 [employer]. All rights reserved. This information is not intended as a substitute for professional medical care. Always follow your healthcare professional's instructions.

## 2022-05-14 ENCOUNTER — HEALTH MAINTENANCE LETTER (OUTPATIENT)
Age: 27
End: 2022-05-14

## 2022-07-09 ENCOUNTER — HEALTH MAINTENANCE LETTER (OUTPATIENT)
Age: 27
End: 2022-07-09

## 2023-04-04 ENCOUNTER — OFFICE VISIT (OUTPATIENT)
Dept: FAMILY MEDICINE | Facility: CLINIC | Age: 28
End: 2023-04-04
Payer: COMMERCIAL

## 2023-04-04 VITALS
BODY MASS INDEX: 38.1 KG/M2 | HEART RATE: 124 BPM | SYSTOLIC BLOOD PRESSURE: 107 MMHG | TEMPERATURE: 98.8 F | DIASTOLIC BLOOD PRESSURE: 72 MMHG | WEIGHT: 258 LBS | OXYGEN SATURATION: 99 % | RESPIRATION RATE: 15 BRPM

## 2023-04-04 DIAGNOSIS — A08.4 VIRAL GASTROENTERITIS: Primary | ICD-10-CM

## 2023-04-04 PROCEDURE — 99213 OFFICE O/P EST LOW 20 MIN: CPT | Performed by: PHYSICIAN ASSISTANT

## 2023-04-04 RX ORDER — ONDANSETRON 4 MG/1
4 TABLET, ORALLY DISINTEGRATING ORAL EVERY 8 HOURS PRN
Qty: 10 TABLET | Refills: 0 | Status: SHIPPED | OUTPATIENT
Start: 2023-04-04 | End: 2023-04-07

## 2023-04-04 RX ORDER — IBUPROFEN 200 MG
200 TABLET ORAL EVERY 4 HOURS PRN
COMMUNITY

## 2023-04-04 NOTE — PROGRESS NOTES
Assessment & Plan:      Problem List Items Addressed This Visit    None  Visit Diagnoses     Viral gastroenteritis    -  Primary    Relevant Medications    ondansetron (ZOFRAN ODT) 4 MG ODT tab        Medical Decision Making    Ramila Kaye is a 27 year old female here for evaluation of 3 days of nausea and stomach pain. Her illness likely is viral in nature. Zofran was prescribed for nausea. Patient should follow-up in three days, or sooner, if symptoms worsen, new symptoms are present, or new concerns arise. Patient is in agreement with this plan, and has no other concerns at this time.     Subjective:      Ramila Kaye is a 27 year old female here for evaluation of 3 days of nausea and stomach pain. She is accompanied by her mother. On Saturday she began noticing nausea and stomach pain, with five episodes of dry heaving. She has a decreased appetite and decreased fluid intake. Denies fevers. No other associated symptoms. History of a cholecystectomy.     The following portions of the patient's history were reviewed and updated as appropriate: allergies, current medications, and problem list.     Review of Systems    Gastrointestinal: positive for nausea and stomach pain.    Allergies  Allergies   Allergen Reactions     Nka [No Known Allergies]        Family History   Problem Relation Age of Onset     Coronary Artery Disease Maternal Grandmother      Glaucoma Paternal Grandmother      GERD Brother        Social History     Tobacco Use     Smoking status: Never     Smokeless tobacco: Never   Vaping Use     Vaping status: Not on file   Substance Use Topics     Alcohol use: Yes        Objective:      /72   Pulse (!) 124   Temp 98.8  F (37.1  C)   Resp 15   Wt 117 kg (258 lb)   LMP 04/02/2023 (Exact Date)   SpO2 99%   BMI 38.10 kg/m      Ears - bilateral TM's and external ear canals normal  Mouth - mucous membranes moist, pharynx normal without lesions, no erthyema  Lymphatics - no palpable  lymphadenopathy  Heart - normal rate, regular rhythm, normal S1, S2, no murmurs, rubs, clicks or gallops  Abdomen - tenderness noted over the epigastric region  no CVA tenderness    Lab & Imaging Results    No results found for any visits on 04/04/23.    I personally reviewed these results and discussed findings with the patient.    The use of Dragon/Dynadmic dictation services was used to construct the content of this note; any grammatical errors are non-intentional. Please contact the author directly if you are in need of any clarification.

## 2023-04-04 NOTE — LETTER
[facility]  [address] [address]  Phone: [phone]  Fax: [phone]    April 4, 2023        Ramila Kaye  [address] [address]          To whom it may concern:    RE: Ramila Kaye    She is excused from work for 4/4/2023 - 4/7/2023.  May return sooner as tolerated.    Please contact me for questions or concerns.      Sincerely,        Eliceo Landry PA-C

## 2023-04-07 ENCOUNTER — OFFICE VISIT (OUTPATIENT)
Dept: FAMILY MEDICINE | Facility: CLINIC | Age: 28
End: 2023-04-07
Payer: COMMERCIAL

## 2023-04-07 VITALS
DIASTOLIC BLOOD PRESSURE: 68 MMHG | HEART RATE: 106 BPM | WEIGHT: 255.2 LBS | OXYGEN SATURATION: 98 % | HEIGHT: 69 IN | RESPIRATION RATE: 16 BRPM | SYSTOLIC BLOOD PRESSURE: 112 MMHG | BODY MASS INDEX: 37.8 KG/M2 | TEMPERATURE: 98 F

## 2023-04-07 DIAGNOSIS — K29.00 ACUTE GASTRITIS WITHOUT HEMORRHAGE, UNSPECIFIED GASTRITIS TYPE: Primary | ICD-10-CM

## 2023-04-07 PROBLEM — Z11.4 SCREENING FOR HIV (HUMAN IMMUNODEFICIENCY VIRUS): Status: ACTIVE | Noted: 2023-04-07

## 2023-04-07 PROBLEM — Z11.4 SCREENING FOR HIV (HUMAN IMMUNODEFICIENCY VIRUS): Status: RESOLVED | Noted: 2023-04-07 | Resolved: 2023-04-07

## 2023-04-07 PROCEDURE — 99214 OFFICE O/P EST MOD 30 MIN: CPT | Performed by: NURSE PRACTITIONER

## 2023-04-07 RX ORDER — OMEPRAZOLE 40 MG/1
40 CAPSULE, DELAYED RELEASE ORAL DAILY
Qty: 60 CAPSULE | Refills: 0 | Status: SHIPPED | OUTPATIENT
Start: 2023-04-07

## 2023-04-07 ASSESSMENT — PAIN SCALES - GENERAL: PAINLEVEL: NO PAIN (1)

## 2023-04-07 NOTE — PROGRESS NOTES
Assessment & Plan     Acute gastritis without hemorrhage, unspecified gastritis type  Patient has acute gastritis without hemorrhage at this time.  Will start Prilosec 40 mg daily for the next month or 2.  She can stop this after that time and use Pepcid once or twice a day over-the-counter as needed.  If symptoms persist would recommend EGD for further evaluation.  I do not think that this is a gallbladder cause since it is epigastric and she does not have any symptoms consistent with diarrhea or abdominal cramping in the lower abdomen which would be more consistent with a postcholecystectomy syndrome.  Recommend follow-up if no improvement.  - omeprazole (PRILOSEC) 40 MG DR capsule; Take 1 capsule (40 mg) by mouth daily    Patient is due for Pap and recommended follow-up appointment with a primary care to establish care to get caught up on preventative care.    See Patient Instructions    Naty Corona NP  [facility]    Jeremy Willett is a 27 year old, presenting for the following health issues:  Abdominal Pain        4/7/2023     1:47 PM   Additional Questions   Roomed by jordan hall   Accompanied by Mother          4/7/2023     1:47 PM   Patient Reported Additional Medications   Patient reports taking the following new medications none     HPI     Concern - abdominal pain  Onset: last week   Description: abdominal pain ,stomach burning    Intensity: moderate, severe  Progression of Symptoms:  same  Accompanying Signs & Symptoms: nausea,headache    Previous history of similar problem: off and since gal bladder removed   Precipitating factors:        Worsened by: stress or eating more acidic food   Alleviating factors:        Improved by: tums helped some   Therapies tried and outcome: tums    Patient was seen in urgent care on 4/4/2023 with diagnosis of viral gastroenteritis with 3 days of symptoms.  Patient continues to have symptoms of epigastric pain.  Worsening with spicy foods  "and eating.  She does have more stress in her life recently.  She denies any black stools or vomiting or coughing up any coffee-ground like emesis.  Patient does get nausea with this.  She did have her gallbladder removed and feels that possibly this could be on and off since her gallbladder as well.  She does admit that Tums have helped some.  She did buy some Prilosec over-the-counter but did not start taking this since she wanted to be seen in clinic prior to taking the medication.    Review of Systems   CONSTITUTIONAL: NEGATIVE for fever, chills, change in weight  RESP: NEGATIVE for significant cough or SOB  CV: NEGATIVE for chest pain, palpitations or peripheral edema  GI: POSITIVE for abdominal pain epigastric and heartburn or reflux  PSYCHIATRIC: NEGATIVE for changes in mood or affect  ROS otherwise negative      Objective    /68   Pulse 106   Temp 98  F (36.7  C) (Tympanic)   Resp 16   Ht 1.753 m (5' 9\")   Wt 115.8 kg (255 lb 3.2 oz)   LMP 04/02/2023 (Exact Date)   SpO2 98%   BMI 37.69 kg/m    Body mass index is 37.69 kg/m .  Physical Exam   GENERAL: healthy, alert and no distress  ABDOMEN: soft, tenderness epigastric, no organomegaly or masses and bowel sounds normal  PSYCH: mentation appears normal, affect normal/bright                 "

## 2023-04-07 NOTE — PATIENT INSTRUCTIONS
Take prilosec 40 mg once daily in the morning 30 minutes prior to breakfast.  Do this for 1-2 months and then discontinue.  If still having symptoms, follow-up in clinic since we may need to do EGD again.

## 2023-06-03 ENCOUNTER — HEALTH MAINTENANCE LETTER (OUTPATIENT)
Age: 28
End: 2023-06-03

## 2023-06-09 ENCOUNTER — TELEPHONE (OUTPATIENT)
Dept: FAMILY MEDICINE | Facility: CLINIC | Age: 28
End: 2023-06-09
Payer: COMMERCIAL

## 2023-06-09 NOTE — TELEPHONE ENCOUNTER
Patient Quality Outreach    Patient is due for the following:   Cervical Cancer Screening - PAP Needed    Next Steps:   pt to schedule    Type of outreach:    Sent letter.        Questions for provider review:    None           Felix Brunson

## 2023-06-09 NOTE — LETTER
June 9, 2023    To  Ramila Kaye  [address] [address]    Your team at [facility] cares about your health. We have reviewed your chart and based on our findings; we are making the following recommendations to better manage your health.     You are in particular need of attention regarding the following:     Schedule a primary care office visit with your provider for a Pap Smear to screen for Cervical Cancer.    If you have already completed these items, please contact the clinic via phone or   [employer] so your care team can review and update your records. Thank you for   choosing [facility] for your healthcare needs. For any questions,   concerns, or to schedule an appointment please contact our clinic.    Healthy Regards,      Your [facility] Care Team

## 2023-10-19 ENCOUNTER — TELEPHONE (OUTPATIENT)
Dept: FAMILY MEDICINE | Facility: CLINIC | Age: 28
End: 2023-10-19
Payer: COMMERCIAL

## 2023-10-19 NOTE — LETTER
October 19, 2023    To  Ramila Kaye  [address] [address]    Your team at [facility] cares about your health. We have reviewed your chart and based on our findings; we are making the following recommendations to better manage your health.     You are in particular need of attention regarding the following:     Schedule a primary care office visit with your provider for a Pap Smear to screen for Cervical Cancer.    If you have already completed these items, please contact the clinic via phone or   [employer] so your care team can review and update your records. Thank you for   choosing [facility] for your healthcare needs. For any questions,   concerns, or to schedule an appointment please contact our clinic.    Healthy Regards,      Your [facility] Care Team

## 2024-02-09 ENCOUNTER — TELEPHONE (OUTPATIENT)
Dept: FAMILY MEDICINE | Facility: CLINIC | Age: 29
End: 2024-02-09
Payer: COMMERCIAL

## 2024-02-09 NOTE — LETTER
February 9, 2024    To  Ramila Kaye  [address] [address]    Your team at [facility] cares about your health. We have reviewed your chart and based on our findings; we are making the following recommendations to better manage your health.     You are in particular need of attention regarding the following:     Schedule a primary care office visit with your provider for a Pap Smear to screen for Cervical Cancer.  PREVENTATIVE VISIT: Physical    If you have already completed these items, please contact the clinic via phone or   [employer] so your care team can review and update your records. Thank you for   choosing [facility] for your healthcare needs. For any questions,   concerns, or to schedule an appointment please contact our clinic.    Healthy Regards,      Your [facility] Care Team

## 2024-02-09 NOTE — TELEPHONE ENCOUNTER
Patient Quality Outreach    Patient is due for the following:   Cervical Cancer Screening - PAP Needed  Physical Preventive Adult Physical    Next Steps:   Pt to schedule    Type of outreach:    Sent letter.      Questions for provider review:    None           Felix Brunson

## 2024-02-13 ENCOUNTER — OFFICE VISIT (OUTPATIENT)
Dept: FAMILY MEDICINE | Facility: CLINIC | Age: 29
End: 2024-02-13
Payer: COMMERCIAL

## 2024-02-13 VITALS
OXYGEN SATURATION: 99 % | RESPIRATION RATE: 16 BRPM | SYSTOLIC BLOOD PRESSURE: 123 MMHG | TEMPERATURE: 98.3 F | DIASTOLIC BLOOD PRESSURE: 76 MMHG | WEIGHT: 268.6 LBS | BODY MASS INDEX: 39.67 KG/M2 | HEART RATE: 82 BPM

## 2024-02-13 DIAGNOSIS — R59.1 LYMPHADENOPATHY: Primary | ICD-10-CM

## 2024-02-13 LAB
DEPRECATED S PYO AG THROAT QL EIA: NEGATIVE
GROUP A STREP BY PCR: NOT DETECTED

## 2024-02-13 PROCEDURE — 99213 OFFICE O/P EST LOW 20 MIN: CPT | Performed by: PHYSICIAN ASSISTANT

## 2024-02-13 PROCEDURE — 87651 STREP A DNA AMP PROBE: CPT | Performed by: PHYSICIAN ASSISTANT

## 2024-02-13 ASSESSMENT — ENCOUNTER SYMPTOMS
VOMITING: 0
NAUSEA: 1
FATIGUE: 1
COUGH: 0
RHINORRHEA: 1
ADENOPATHY: 1
NECK PAIN: 0
FEVER: 0

## 2024-02-13 NOTE — LETTER
February 13, 2024      Ramila Kaye  [address] [address]        To Whom It May Concern:    Ramila Kaye  was seen on 2/13/2024.  Please excuse her absence from work due to illness. Expected time away is 1-5 days depending on symptoms.       Sincerely,        Gail Rouse PA-C

## 2024-02-13 NOTE — PROGRESS NOTES
Patient presents with:  Fatigue: Started friday. Rt side gland behind Rt ear tender. Some pressure back of neck.  Nausea: Usually after big meal.      Clinical Decision Making:  Patient c/o fatigue and tenderness to right upper neck.  Physical exam is most consistent with lymphadenopathy.  It is mobile, mildly tender to palpation, and round less than 2 cm in size.  Differential diagnoses include but not limited to strep throat, viral illness (cytomegalovirus, HIV, mono), cyst, or lymphoma or other malignancy. Patient denies fevers. Illness symptoms started on Friday, 2/9/24. Strep test negative. Palpable <2 cm tonsillar lymph node on right noted. No warmth or erythema to area noted. Highly suspicious of viral illness. Counseled to do watchful waiting at this time and to come back to clinic if it does not resolve in a few days.  Patient denies any high risk activity for HIV.      ICD-10-CM    1. Lymphadenopathy  R59.1 Streptococcus A Rapid Screen w/Reflex to PCR          HPI:  Ramila Kaye is a 28 year old female who presents today complaining of  fatigue and behind right ear tenderness/mass. Denies fevers and cough. Reports some congestion on and off since Friday. Reports she has a headache now and then which she takes tylenol and has relief. Had leave work on Friday because she was ill. At home COVID test was negative. Does report off and on nausea, usually post a larger meal.     History obtained from the patient.    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 Negative for fever.   HENT:  Positive for congestion and rhinorrhea.    Respiratory:  Negative for cough.    Gastrointestinal:  Positive for nausea. Negative for vomiting.   Musculoskeletal:  Negative for neck pain.   Hematological:  Positive for adenopathy.       Vitals:    02/13/24 1158   BP: 123/76   Pulse: 82   Resp: 16   Temp: 98.3  F (36.8  C)   TempSrc: Oral   SpO2: 99%   Weight: 121.8 kg (268 lb 9.6 oz)       Physical Exam  Constitutional:       General: She is not in acute distress.     Appearance: Normal appearance. She is not ill-appearing.   HENT:      Head: Normocephalic.      Right Ear: Hearing, tympanic membrane, ear canal and external ear normal.      Left Ear: Hearing, tympanic membrane, ear canal and external ear normal.      Nose: No congestion.      Mouth/Throat:      Pharynx: No posterior oropharyngeal erythema.   Neck:        Comments: Tonsillar adenopathy  Cardiovascular:      Rate and Rhythm: Normal rate.      Heart sounds: Normal heart sounds.   Pulmonary:      Effort: Pulmonary effort is normal.      Breath sounds: Normal breath sounds.   Lymphadenopathy:      Cervical: Cervical adenopathy present.      Right cervical: Superficial cervical adenopathy present.   Neurological:      General: No focal deficit present.      Mental Status: She is alert.   Psychiatric:         Mood and Affect: Mood normal.       Results:  Results for orders placed or performed in visit on 02/13/24   Streptococcus A Rapid Screen w/Reflex to PCR     Status: Normal    Specimen: Throat; Swab   Result Value Ref Range    Group A Strep antigen Negative Negative       At the end of the encounter, I discussed results, diagnosis, medications. Discussed red flags for immediate return to clinic/ER, as well as indications for follow up if no improvement. Patient understood and agreed to plan. Patient was stable for discharge.

## 2024-03-31 NOTE — MR AVS SNAPSHOT
After Visit Summary   4/16/2018    Ramila Kaye    MRN: [id number]           Patient Information     Date Of Birth          1995        Visit Information        Provider Department      4/16/2018 6:00 PM Angela Wheeler PA-C [facility]        Today's Diagnoses     Impingement syndrome, shoulder, right    -  1    Acute pain of right shoulder          Care Instructions    Try ibuprofen 600mg three times daily with meals for the next 4-5 days    Ice     Avoid aggravating activities    Start PT - this is not only for current pain but to hopefully prevent this from happening in the future    If no improvement or any worsening, please let me know and we can have you see one of our sports orthopedic providers      Shoulder Impingement Syndrome  The rotator cuff is a group of muscles and tendons that surround the shoulder joint. These muscles and tendons hold the arm in its joint. They help the shoulder move. The rotator cuff muscles and tendons can become irritated from repeated rubbing against the shoulder bone. This is called shoulder impingement syndrome or rotator cuff tendonitis.     If your case is mild, you may only need to rest the shoulder and then do certain exercises to strengthen the muscles. You can also take anti-inflammatory medicines. Steroid injections into the shoulder can ease inflammation. But you can have only a limited number of these. If the condition gets worse, your shoulder muscles may become thin and weak. This can lead to a rotator cuff tear.  Symptoms of shoulder impingement syndrome may include:    Shoulder pain that gets worse when you raise your arm overhead    Weakness of the shoulder muscles when you use your arm overhead    Popping and clicking when you move your shoulder    Shoulder pain that wakes you up at night, especially when you sleep on the affected shoulder    Sudden pain in your shoulder when you lift or reach  Home care  Follow these  tips to take care of yourself at home:    Avoid activities that make your pain worse. These include raising your arms overhead, repeating the same motion over and over, or lifting heavy objects.    Don t hold your arm in one position for a long time. Keep it moving.    Put an ice pack on the sore area for 20 minutes every 1 to 2 hours for the first day. You can make an ice pack by putting ice cubes in a plastic bag. Wrap the bag in a towel before putting it on your shoulder. A frozen bag of peas or something similar can also be used as an ice pack. Use the ice packs 3 to 4 times a day for the next 2 days. Continue using the ice to relieve of pain and swelling as needed.    You may take acetaminophen or ibuprofen to control pain, unless another medicine was prescribed. If prednisone was prescribed, don t take anti-inflammatory medicines. If you have chronic liver or kidney disease or ever had a stomach ulcer or gastrointestinal bleeding, talk with your doctor before using these medicines.    After your symptoms ease, you may get physical therapy or start a home exercise program. This can strengthen your shoulder muscles and help your range of motion. Talk with your doctor about what is best for your condition.  Follow-up care  Follow up with your healthcare provider, or as advised.  When to seek medical advice  Call your healthcare provider right away if any of these occur:    Shoulder pain that gets worse and wakes you up at night    Your shoulder or arm swells    Numbness, tingling, or pain that travels down the arm to the hand    Loss of shoulder strength    Fever or chills  Date Last Reviewed: 8/1/2016 2000-2017 [employer]. [address] [place] [address], [place] [address]. All rights reserved. This information is not intended as a substitute for professional medical care. Always follow your healthcare professional's instructions.                Follow-ups after your visit        Additional Services      [facility] PT, HAND, AND CHIROPRACTIC REFERRAL       **This order will print in the [facility] Scheduling Office**    Physical Therapy, Hand Therapy and Chiropractic Care are available through:    *[facility]  *[facility]  *[facility]    Call one number to schedule at any of the above locations: [phone].    Your provider has referred you to: Physical Therapy at [facility] or [facility]    Indication/Reason for Referral: Shoulder Pain  Onset of Illness: 2 days ago although mild shoulder pains on/off for a few years  Therapy Orders: Evaluate and Treat  Special Programs: None  Special Request: None    Grace Edwards      Additional Comments for the Therapist or Chiropractor: ? Impingement syndrome aggravated recently by overuse    Please be aware that coverage of these services is subject to the terms and limitations of your health insurance plan.  Call member services at your health plan with any benefit or coverage questions.      Please bring the following to your appointment:    *Your personal calendar for scheduling future appointments  *Comfortable clothing                  Your next 10 appointments already scheduled     May 03, 2018  4:40 PM CDT   Return Visit with Bao Alan DO   [facility] ([facility])    [address] [place] [id number] [phone]              Who to contact     Normal or non-critical lab and imaging results will be communicated to you by MyChart, letter or phone within 4 business days after the clinic has received the results. If you do not hear from us within 7 days, please contact the clinic through MyChart or phone. If you have a critical or abnormal lab result, we will notify you by phone as soon as possible.  Submit refill requests through [employer] or call your pharmacy and they will forward the refill request to us. Please allow 3 business days for your refill to be completed.          If you  "need to speak with a  for additional information , please call: [phone]             Additional Information About Your Visit        [employer] Information     [employer] gives you secure access to your electronic health record. If you see a primary care provider, you can also send messages to your care team and make appointments. If you have questions, please call your primary care clinic.  If you do not have a primary care provider, please call [phone] and they will assist you.        Care EveryWhere ID     This is your Care EveryWhere ID. This could be used by other organizations to access your [facility] medical records  [id number]        Your Vitals Were     Pulse Temperature Height BMI (Body Mass Index)          108 100  F (37.8  C) (Tympanic) 5' 9\" (1.753 m) 36.62 kg/m2         Blood Pressure from Last 3 Encounters:   04/16/18 122/72   03/29/18 114/77   03/16/18 123/53    Weight from Last 3 Encounters:   04/16/18 248 lb (112.5 kg)   03/16/18 228 lb (103.4 kg)   03/13/18 225 lb (102.1 kg)              We Performed the Following     [facility] PT, HAND, AND CHIROPRACTIC REFERRAL        Primary Care Provider Office Phone # Fax #    Gail Hyatt PA-C [phone] [address] [facility] [address]        Equal Access to Services     IVANNA EUBANKS : Hadii aad ku hadasho Soomaali, waaxda luqadaha, qaybta kaalmada shayy, bri downey . So [facility] [phone].    ATENCIÓN: Si habla español, tiene a regalado disposición servicios gratuitos de asistencia lingüística. Jordan al [phone].    We comply with applicable federal civil rights laws and [place] laws. We do not discriminate on the basis of race, color, national origin, age, disability, sex, sexual orientation, or gender identity.            Thank you!     Thank you for choosing [facility]  for your care. Our goal is always to provide you with excellent care. Hearing back from our " patients is one way we can continue to improve our services. Please take a few minutes to complete the written survey that you may receive in the mail after your visit with us. Thank you!             Your Updated Medication List - Protect others around you: Learn how to safely use, store and throw away your medicines at www.disposemymeds.org.      Notice  As of 4/16/2018  6:20 PM    You have not been prescribed any medications.       - - -

## 2024-05-21 ENCOUNTER — OFFICE VISIT (OUTPATIENT)
Dept: FAMILY MEDICINE | Facility: CLINIC | Age: 29
End: 2024-05-21
Payer: COMMERCIAL

## 2024-05-21 VITALS
BODY MASS INDEX: 39.58 KG/M2 | SYSTOLIC BLOOD PRESSURE: 134 MMHG | RESPIRATION RATE: 18 BRPM | WEIGHT: 268 LBS | DIASTOLIC BLOOD PRESSURE: 84 MMHG | OXYGEN SATURATION: 97 % | HEART RATE: 85 BPM | TEMPERATURE: 98.2 F

## 2024-05-21 DIAGNOSIS — R20.2 TINGLING IN EXTREMITIES: Primary | ICD-10-CM

## 2024-05-21 LAB
ATRIAL RATE - MUSE: 81 BPM
BASOPHILS # BLD AUTO: 0 10E3/UL (ref 0–0.2)
BASOPHILS NFR BLD AUTO: 0 %
DIASTOLIC BLOOD PRESSURE - MUSE: NORMAL MMHG
EOSINOPHIL # BLD AUTO: 0.1 10E3/UL (ref 0–0.7)
EOSINOPHIL NFR BLD AUTO: 1 %
ERYTHROCYTE [DISTWIDTH] IN BLOOD BY AUTOMATED COUNT: 13.6 % (ref 10–15)
HCT VFR BLD AUTO: 40 % (ref 35–47)
HGB BLD-MCNC: 12.7 G/DL (ref 11.7–15.7)
IMM GRANULOCYTES # BLD: 0 10E3/UL
IMM GRANULOCYTES NFR BLD: 0 %
INTERPRETATION ECG - MUSE: NORMAL
LYMPHOCYTES # BLD AUTO: 1.8 10E3/UL (ref 0.8–5.3)
LYMPHOCYTES NFR BLD AUTO: 21 %
MCH RBC QN AUTO: 26.7 PG (ref 26.5–33)
MCHC RBC AUTO-ENTMCNC: 31.8 G/DL (ref 31.5–36.5)
MCV RBC AUTO: 84 FL (ref 78–100)
MONOCYTES # BLD AUTO: 0.6 10E3/UL (ref 0–1.3)
MONOCYTES NFR BLD AUTO: 8 %
NEUTROPHILS # BLD AUTO: 5.9 10E3/UL (ref 1.6–8.3)
NEUTROPHILS NFR BLD AUTO: 69 %
P AXIS - MUSE: 26 DEGREES
PLATELET # BLD AUTO: 292 10E3/UL (ref 150–450)
PR INTERVAL - MUSE: 144 MS
QRS DURATION - MUSE: 90 MS
QT - MUSE: 362 MS
QTC - MUSE: 420 MS
R AXIS - MUSE: 18 DEGREES
RBC # BLD AUTO: 4.76 10E6/UL (ref 3.8–5.2)
SYSTOLIC BLOOD PRESSURE - MUSE: NORMAL MMHG
T AXIS - MUSE: 18 DEGREES
VENTRICULAR RATE- MUSE: 81 BPM
WBC # BLD AUTO: 8.5 10E3/UL (ref 4–11)

## 2024-05-21 PROCEDURE — 93005 ELECTROCARDIOGRAM TRACING: CPT | Performed by: PHYSICIAN ASSISTANT

## 2024-05-21 PROCEDURE — 85025 COMPLETE CBC W/AUTO DIFF WBC: CPT | Performed by: PHYSICIAN ASSISTANT

## 2024-05-21 PROCEDURE — 93010 ELECTROCARDIOGRAM REPORT: CPT | Performed by: STUDENT IN AN ORGANIZED HEALTH CARE EDUCATION/TRAINING PROGRAM

## 2024-05-21 PROCEDURE — 80048 BASIC METABOLIC PNL TOTAL CA: CPT | Performed by: PHYSICIAN ASSISTANT

## 2024-05-21 PROCEDURE — 36415 COLL VENOUS BLD VENIPUNCTURE: CPT | Performed by: PHYSICIAN ASSISTANT

## 2024-05-21 PROCEDURE — 99214 OFFICE O/P EST MOD 30 MIN: CPT | Performed by: PHYSICIAN ASSISTANT

## 2024-05-21 RX ORDER — ACETAMINOPHEN 500 MG
500-1000 TABLET ORAL EVERY 6 HOURS PRN
COMMUNITY

## 2024-05-21 RX ORDER — CYCLOBENZAPRINE HCL 5 MG
5 TABLET ORAL
Qty: 14 TABLET | Refills: 0 | Status: SHIPPED | OUTPATIENT
Start: 2024-05-21 | End: 2024-05-28

## 2024-05-21 NOTE — PROGRESS NOTES
Assessment & Plan:      Problem List Items Addressed This Visit    None  Visit Diagnoses       Tingling in extremities    -  Primary    Relevant Medications    cyclobenzaprine (FLEXERIL) 5 MG tablet    Other Relevant Orders    EKG 12-lead, tracing only (Completed)    CBC with platelets and differential (Completed)    Basic metabolic panel  (Ca, Cl, CO2, Creat, Gluc, K, Na, BUN)          Medical Decision Making  Patient presents with tingling and numbness in the extremities on and off for the last 5 days.  EKG shows normal sinus rhythm.  CBC is negative for signs of infection and anemia.  BMP is in process to check electrolytes and rule out hypoglycemia.  Symptoms seem most consistent with mild overuse injury resulting in muscle irritation of the bilateral hips and low back.  Recommend trial of muscle relaxer, over-the-counter analgesics, warm compresses, and avoidance of aggravating activities.     Subjective:      Ramila Kaye is a 28 year old female here for evaluation of tingling and numbness in the extremities.  Onset of symptoms was 5 days ago.  Patient primarily notes the symptoms after repositioning or going from laying to standing.  Numbness and tingling will last about 30 seconds and then will completely resolved.  No known injury or trauma.  Patient does note a day where she was on her feet for longer than usual but 1 week ago.  She also notes sensation of feeling like her left leg is about to cramp, but does not quite spasm.     The following portions of the patient's history were reviewed and updated as appropriate: allergies, current medications, and problem list.     Review of Systems  Pertinent items are noted in HPI.    Allergies  Allergies   Allergen Reactions    Nka [No Known Allergies]        Family History   Problem Relation Age of Onset    Coronary Artery Disease Maternal Grandmother     Glaucoma Paternal Grandmother     GERD Brother        Social History     Tobacco Use    Smoking status:  Never    Smokeless tobacco: Never   Substance Use Topics    Alcohol use: Yes        Objective:      /84 (BP Location: Right arm, Patient Position: Sitting, Cuff Size: Adult Regular)   Pulse 85   Temp 98.2  F (36.8  C) (Oral)   Resp 18   Wt 121.6 kg (268 lb)   LMP 05/16/2024 (Exact Date)   SpO2 97%   BMI 39.58 kg/m    General appearance - alert, well appearing, and in no distress and non-toxic  Chest - clear to auscultation, no wheezes, rales or rhonchi, symmetric air entry  Heart - normal rate, regular rhythm, normal S1, S2, no murmurs, rubs, clicks or gallops  Back exam - no significant midline tenderness, slight tenderness to palpation of the lumbosacral paraspinal muscles  Extremities - tenderness over the hip joints bilaterally     Lab & Imaging Results    Results for orders placed or performed in visit on 05/21/24   CBC with platelets and differential     Status: None   Result Value Ref Range    WBC Count 8.5 4.0 - 11.0 10e3/uL    RBC Count 4.76 3.80 - 5.20 10e6/uL    Hemoglobin 12.7 11.7 - 15.7 g/dL    Hematocrit 40.0 35.0 - 47.0 %    MCV 84 78 - 100 fL    MCH 26.7 26.5 - 33.0 pg    MCHC 31.8 31.5 - 36.5 g/dL    RDW 13.6 10.0 - 15.0 %    Platelet Count 292 150 - 450 10e3/uL    % Neutrophils 69 %    % Lymphocytes 21 %    % Monocytes 8 %    % Eosinophils 1 %    % Basophils 0 %    % Immature Granulocytes 0 %    Absolute Neutrophils 5.9 1.6 - 8.3 10e3/uL    Absolute Lymphocytes 1.8 0.8 - 5.3 10e3/uL    Absolute Monocytes 0.6 0.0 - 1.3 10e3/uL    Absolute Eosinophils 0.1 0.0 - 0.7 10e3/uL    Absolute Basophils 0.0 0.0 - 0.2 10e3/uL    Absolute Immature Granulocytes 0.0 <=0.4 10e3/uL   EKG 12-lead, tracing only     Status: None   Result Value Ref Range    Systolic Blood Pressure  mmHg    Diastolic Blood Pressure  mmHg    Ventricular Rate 81 BPM    Atrial Rate 81 BPM    NH Interval 144 ms    QRS Duration 90 ms     ms    QTc 420 ms    P Axis 26 degrees    R AXIS 18 degrees    T Axis 18 degrees     Interpretation ECG       Sinus rhythm  Normal ECG  No previous ECGs available  Confirmed by JESSICA OAKLEY MD LOC:JN ([id number]) on 5/21/2024 2:43:31 PM     CBC with platelets and differential     Status: None    Narrative    The following orders were created for panel order CBC with platelets and differential.  Procedure                               Abnormality         Status                     ---------                               -----------         ------                     CBC with platelets and d...[[id number]]                      Final result                 Please view results for these tests on the individual orders.       I personally reviewed these results and discussed findings with the patient.    The use of [employer] services was used to construct the content of this note; any grammatical errors are non-intentional. Please contact the author directly if you are in need of any clarification.

## 2024-05-21 NOTE — PATIENT INSTRUCTIONS
You were seen today for a muscle strain.    Symptom management:  - Take the flexeril to relax the muscle, start with just 5 mg, if you tolerate that alright may take up to 10 mg at a time up to 3 times a day as needed.  - May use acetaminophen 500-1000 mg for first 3 hours (not to exceed 4000 mg in one day), then ibuprofen 400-600 mg with food for the next 3 hours, and alternate as needed  - Heat pads as tolerated  - Rest with occasional light stretching    Reasons to be seen immediately in the emergency room:  - Develop numbness or tingling in the groin or legs  - Sharp shooting pain down to your legs  - Loss of bowel or bladder function    Otherwise, if pain is not improving after 1 week, return for re-evaluation or see your primary care provider.

## 2024-05-21 NOTE — LETTER
[facility]  [address] [facility] [address]  Phone: [phone]  Fax: [phone]    May 21, 2024        Ramila Kaye  [address] [address]          To whom it may concern:    RE: Ramila GARAY Sevenisidro    She is excused from work for 5/21/2024 - 5/22/2024.  May return sooner as tolerated.    Please contact me for questions or concerns.      Sincerely,      Eliceo Landry

## 2024-05-22 LAB
ANION GAP SERPL CALCULATED.3IONS-SCNC: 10 MMOL/L (ref 7–15)
BUN SERPL-MCNC: 11 MG/DL (ref 6–20)
CALCIUM SERPL-MCNC: 9.4 MG/DL (ref 8.6–10)
CHLORIDE SERPL-SCNC: 107 MMOL/L (ref 98–107)
CREAT SERPL-MCNC: 0.75 MG/DL (ref 0.51–0.95)
DEPRECATED HCO3 PLAS-SCNC: 23 MMOL/L (ref 22–29)
EGFRCR SERPLBLD CKD-EPI 2021: >90 ML/MIN/1.73M2
GLUCOSE SERPL-MCNC: 79 MG/DL (ref 70–99)
POTASSIUM SERPL-SCNC: 4.1 MMOL/L (ref 3.4–5.3)
SODIUM SERPL-SCNC: 140 MMOL/L (ref 135–145)

## 2024-06-21 ENCOUNTER — TELEPHONE (OUTPATIENT)
Dept: FAMILY MEDICINE | Facility: CLINIC | Age: 29
End: 2024-06-21
Payer: COMMERCIAL

## 2024-06-21 NOTE — LETTER
June 21, 2024    To  Ramila Kaye  [address] [address]    Your team at [facility] cares about your health. We have reviewed your chart and based on our findings; we are making the following recommendations to better manage your health.     You are in particular need of attention regarding the following:     Schedule a primary care office visit with your provider for a Pap Smear to screen for Cervical Cancer.  PREVENTATIVE VISIT: Physical    If you have already completed these items, please contact the clinic via phone or   [employer] so your care team can review and update your records. Thank you for   choosing [facility] for your healthcare needs. For any questions,   concerns, or to schedule an appointment please contact our clinic.    Healthy Regards,      Your [facility] Care Team

## 2024-06-21 NOTE — TELEPHONE ENCOUNTER
Patient Quality Outreach    Patient is due for the following:   Cervical Cancer Screening - PAP Needed  Physical Annual Wellness Visit    Next Steps:   Pt to schedule    Type of outreach:    Sent letter.      Questions for provider review:    None           Felix Brunson

## 2024-07-06 ENCOUNTER — HEALTH MAINTENANCE LETTER (OUTPATIENT)
Age: 29
End: 2024-07-06

## 2024-12-16 ENCOUNTER — TELEPHONE (OUTPATIENT)
Dept: FAMILY MEDICINE | Facility: CLINIC | Age: 29
End: 2024-12-16
Payer: COMMERCIAL

## 2024-12-16 NOTE — LETTER
December 16, 2024    To  Ramila Kaye  [address] [address]    Your team at [facility] cares about your health. We have reviewed your chart and based on our findings; we are making the following recommendations to better manage your health.     You are in particular need of attention regarding the following:     Schedule a primary care office visit with your provider for a Pap Smear to screen for Cervical Cancer.  PREVENTATIVE VISIT: Physical    If you have already completed these items, please contact the clinic via phone or   [employer] so your care team can review and update your records. Thank you for   choosing [facility] for your healthcare needs. For any questions,   concerns, or to schedule an appointment please contact our clinic.    Healthy Regards,      Your [facility] Care Team

## 2024-12-16 NOTE — TELEPHONE ENCOUNTER
Patient Quality Outreach    Patient is due for the following:   Cervical Cancer Screening - PAP Needed  Physical Preventive Adult Physical    Action(s) Taken:   Pt to schedule    Type of outreach:    Sent letter.    Questions for provider review:    None           Felix Brunson

## 2025-07-13 ENCOUNTER — HEALTH MAINTENANCE LETTER (OUTPATIENT)
Age: 30
End: 2025-07-13

## (undated) DEVICE — SU MONOCRYL 4-0 PS-2 18" UND Y496G

## (undated) DEVICE — Device

## (undated) DEVICE — CLIP APPLIER ENDO 05MM MED/LG 176630

## (undated) DEVICE — PREP CHLORAPREP 26ML TINTED ORANGE  260815

## (undated) DEVICE — ENDO CANNULA 05MM VERSAONE UNIVERSAL UNVCA5STF

## (undated) DEVICE — GOWN LG DISP 9515

## (undated) DEVICE — NDL INSUFFLATION 120MM VERRES 172015

## (undated) DEVICE — GLOVE PROTEXIS W/NEU-THERA 6.5  2D73TE65

## (undated) DEVICE — SOL WATER IRRIG 1000ML BOTTLE 07139-09

## (undated) DEVICE — SYR 05ML LL W/O NDL

## (undated) DEVICE — ENDO TROCAR 12MM VERSAONE BLADELESS W/STD FIX CAN NONB12STF

## (undated) DEVICE — SOL NACL 0.9% IRRIG 1000ML BOTTLE 07138-09

## (undated) DEVICE — ENDO SHEARS RENEW LAP ENDOCUT SCISSOR TIP 16.5MM 3142

## (undated) DEVICE — DECANTER VIAL 2006S

## (undated) DEVICE — ENDO POUCH GOLD 10MM ECATCH 173050G

## (undated) DEVICE — ADHESIVE SWIFTSET 0.8ML OCTYL SS6

## (undated) DEVICE — ESU HOLDER LAP INST DISP PURPLE LONG 330MM H-PRO-330

## (undated) DEVICE — GLOVE PROTEXIS BLUE W/NEU-THERA 6.5  2D73EB65

## (undated) DEVICE — ENDO TROCAR OPTICAL 05MM VERSAPORT PLUS W/FIX CAN ONB5STF

## (undated) DEVICE — STOCKING SLEEVE COMPRESSION CALF MED

## (undated) RX ORDER — NEOSTIGMINE METHYLSULFATE 1 MG/ML
VIAL (ML) INJECTION
Status: DISPENSED
Start: 2017-10-25

## (undated) RX ORDER — PROPOFOL 10 MG/ML
INJECTION, EMULSION INTRAVENOUS
Status: DISPENSED
Start: 2017-10-25

## (undated) RX ORDER — HYDROMORPHONE HYDROCHLORIDE 1 MG/ML
INJECTION, SOLUTION INTRAMUSCULAR; INTRAVENOUS; SUBCUTANEOUS
Status: DISPENSED
Start: 2017-10-25

## (undated) RX ORDER — GLYCOPYRROLATE 0.2 MG/ML
INJECTION, SOLUTION INTRAMUSCULAR; INTRAVENOUS
Status: DISPENSED
Start: 2019-11-29

## (undated) RX ORDER — LIDOCAINE HYDROCHLORIDE 10 MG/ML
INJECTION, SOLUTION EPIDURAL; INFILTRATION; INTRACAUDAL; PERINEURAL
Status: DISPENSED
Start: 2019-11-29

## (undated) RX ORDER — FENTANYL CITRATE 50 UG/ML
INJECTION, SOLUTION INTRAMUSCULAR; INTRAVENOUS
Status: DISPENSED
Start: 2017-10-25

## (undated) RX ORDER — GLYCOPYRROLATE 0.2 MG/ML
INJECTION, SOLUTION INTRAMUSCULAR; INTRAVENOUS
Status: DISPENSED
Start: 2017-10-25

## (undated) RX ORDER — BUPIVACAINE HYDROCHLORIDE AND EPINEPHRINE 2.5; 5 MG/ML; UG/ML
INJECTION, SOLUTION INFILTRATION; PERINEURAL
Status: DISPENSED
Start: 2017-10-25

## (undated) RX ORDER — LIDOCAINE HYDROCHLORIDE 10 MG/ML
INJECTION, SOLUTION EPIDURAL; INFILTRATION; INTRACAUDAL; PERINEURAL
Status: DISPENSED
Start: 2017-10-25

## (undated) RX ORDER — ONDANSETRON 2 MG/ML
INJECTION INTRAMUSCULAR; INTRAVENOUS
Status: DISPENSED
Start: 2017-10-25

## (undated) RX ORDER — HYDROCODONE BITARTRATE AND ACETAMINOPHEN 5; 325 MG/1; MG/1
TABLET ORAL
Status: DISPENSED
Start: 2017-10-25

## (undated) RX ORDER — KETOROLAC TROMETHAMINE 30 MG/ML
INJECTION, SOLUTION INTRAMUSCULAR; INTRAVENOUS
Status: DISPENSED
Start: 2017-10-25

## (undated) RX ORDER — DEXAMETHASONE SODIUM PHOSPHATE 4 MG/ML
INJECTION, SOLUTION INTRA-ARTICULAR; INTRALESIONAL; INTRAMUSCULAR; INTRAVENOUS; SOFT TISSUE
Status: DISPENSED
Start: 2017-10-25